# Patient Record
Sex: MALE | Race: WHITE | NOT HISPANIC OR LATINO | Employment: FULL TIME | URBAN - METROPOLITAN AREA
[De-identification: names, ages, dates, MRNs, and addresses within clinical notes are randomized per-mention and may not be internally consistent; named-entity substitution may affect disease eponyms.]

---

## 2023-05-12 LAB
ALBUMIN (G/DL) IN SER/PLAS: 4.3 G/DL (ref 3.4–5)
ANION GAP IN SER/PLAS: 11 MMOL/L (ref 10–20)
CALCIDIOL (25 OH VITAMIN D3) (NG/ML) IN SER/PLAS: 63 NG/ML
CALCIUM (MG/DL) IN SER/PLAS: 9.9 MG/DL (ref 8.6–10.6)
CARBON DIOXIDE, TOTAL (MMOL/L) IN SER/PLAS: 28 MMOL/L (ref 21–32)
CHLORIDE (MMOL/L) IN SER/PLAS: 104 MMOL/L (ref 98–107)
CREATININE (MG/DL) IN SER/PLAS: 0.98 MG/DL (ref 0.5–1.3)
GFR MALE: >90 ML/MIN/1.73M2
GLUCOSE (MG/DL) IN SER/PLAS: 82 MG/DL (ref 74–99)
PARATHYRIN INTACT (PG/ML) IN SER/PLAS: 41 PG/ML (ref 18.5–88)
PHOSPHATE (MG/DL) IN SER/PLAS: 3.9 MG/DL (ref 2.5–4.9)
POTASSIUM (MMOL/L) IN SER/PLAS: 4.1 MMOL/L (ref 3.5–5.3)
SODIUM (MMOL/L) IN SER/PLAS: 139 MMOL/L (ref 136–145)
THYROTROPIN (MIU/L) IN SER/PLAS BY DETECTION LIMIT <= 0.05 MIU/L: 1.32 MIU/L (ref 0.44–3.98)
THYROXINE (T4) FREE (NG/DL) IN SER/PLAS: 0.97 NG/DL (ref 0.78–1.48)
UREA NITROGEN (MG/DL) IN SER/PLAS: 15 MG/DL (ref 6–23)

## 2023-09-29 PROBLEM — E16.2 HYPOGLYCEMIA: Status: ACTIVE | Noted: 2023-09-29

## 2023-09-29 PROBLEM — J45.909 REACTIVE AIRWAY DISEASE (HHS-HCC): Status: ACTIVE | Noted: 2023-09-29

## 2023-09-29 PROBLEM — E03.9 HYPOTHYROIDISM: Status: ACTIVE | Noted: 2023-09-29

## 2023-09-29 PROBLEM — S62.609A FINGER FRACTURE, LEFT: Status: ACTIVE | Noted: 2023-09-29

## 2023-09-29 PROBLEM — R79.89 ELEVATED PROLACTIN LEVEL: Status: ACTIVE | Noted: 2023-09-29

## 2023-09-29 PROBLEM — M25.642 STIFFNESS OF FINGER JOINT OF LEFT HAND: Status: ACTIVE | Noted: 2023-09-29

## 2023-09-29 PROBLEM — G47.33 OBSTRUCTIVE SLEEP APNEA: Status: ACTIVE | Noted: 2023-09-29

## 2023-09-29 PROBLEM — K92.1 BLOOD IN STOOL: Status: ACTIVE | Noted: 2023-09-29

## 2023-09-29 PROBLEM — R00.0 TACHYCARDIA: Status: ACTIVE | Noted: 2023-09-29

## 2023-09-29 PROBLEM — G47.19 EXCESSIVE DAYTIME SLEEPINESS: Status: ACTIVE | Noted: 2023-09-29

## 2023-09-29 PROBLEM — E66.9 CLASS 2 OBESITY IN ADULT: Status: ACTIVE | Noted: 2023-09-29

## 2023-09-29 PROBLEM — E55.9 VITAMIN D DEFICIENCY: Status: ACTIVE | Noted: 2023-09-29

## 2023-09-29 PROBLEM — E66.812 CLASS 2 OBESITY IN ADULT: Status: ACTIVE | Noted: 2023-09-29

## 2023-09-29 PROBLEM — G47.30 SLEEP DISORDER BREATHING: Status: ACTIVE | Noted: 2023-09-29

## 2023-09-29 PROBLEM — R79.9 ABNORMAL BLOOD CHEMISTRY: Status: ACTIVE | Noted: 2023-09-29

## 2023-09-29 PROBLEM — E29.1 ANDROGEN DEFICIENCY: Status: ACTIVE | Noted: 2023-09-29

## 2023-09-29 PROBLEM — R79.89 ABNORMAL THYROID SCREEN (BLOOD): Status: ACTIVE | Noted: 2023-09-29

## 2023-09-29 PROBLEM — E29.1 HYPOGONADISM IN MALE: Status: ACTIVE | Noted: 2023-09-29

## 2023-09-29 PROBLEM — S62.657A CLOSED NONDISPLACED FRACTURE OF MIDDLE PHALANX OF LEFT LITTLE FINGER: Status: ACTIVE | Noted: 2023-09-29

## 2023-09-29 PROBLEM — N64.89 PSEUDOANGIOMATOUS STROMAL HYPERPLASIA OF BREAST: Status: ACTIVE | Noted: 2023-09-29

## 2023-09-29 PROBLEM — N62 IDIOPATHIC GYNECOMASTIA: Status: ACTIVE | Noted: 2023-09-29

## 2023-09-29 PROBLEM — T78.40XA ALLERGIC REACTION: Status: ACTIVE | Noted: 2023-09-29

## 2023-09-29 RX ORDER — IBUPROFEN 200 MG
1 CAPSULE ORAL 2 TIMES DAILY
COMMUNITY
Start: 2018-11-02

## 2023-09-29 RX ORDER — VIT C/E/ZN/COPPR/LUTEIN/ZEAXAN 250MG-90MG
5000 CAPSULE ORAL DAILY
COMMUNITY

## 2023-09-29 RX ORDER — PHENOL 1.4 %
1 AEROSOL, SPRAY (ML) MUCOUS MEMBRANE DAILY
COMMUNITY
Start: 2018-11-02

## 2023-09-29 RX ORDER — ALBUTEROL SULFATE 90 UG/1
2 AEROSOL, METERED RESPIRATORY (INHALATION) EVERY 4 HOURS PRN
COMMUNITY
Start: 2020-10-21

## 2023-09-29 RX ORDER — LEVOTHYROXINE SODIUM 50 UG/1
1 TABLET ORAL DAILY
COMMUNITY
Start: 2021-05-12 | End: 2023-10-16 | Stop reason: SDUPTHER

## 2023-10-02 ENCOUNTER — DOCUMENTATION (OUTPATIENT)
Dept: ORTHOPEDIC SURGERY | Facility: CLINIC | Age: 48
End: 2023-10-02

## 2023-10-02 ENCOUNTER — OFFICE VISIT (OUTPATIENT)
Dept: ORTHOPEDIC SURGERY | Facility: CLINIC | Age: 48
End: 2023-10-02
Payer: COMMERCIAL

## 2023-10-02 DIAGNOSIS — S69.92XD FINGER INJURY, LEFT, SUBSEQUENT ENCOUNTER: ICD-10-CM

## 2023-10-02 DIAGNOSIS — S63.639A SPRAIN OF PROXIMAL INTERPHALANGEAL (PIP) JOINT OF FINGER: Primary | ICD-10-CM

## 2023-10-02 PROCEDURE — 99213 OFFICE O/P EST LOW 20 MIN: CPT | Performed by: ORTHOPAEDIC SURGERY

## 2023-10-02 PROCEDURE — 1036F TOBACCO NON-USER: CPT | Performed by: ORTHOPAEDIC SURGERY

## 2023-10-02 NOTE — ADDENDUM NOTE
Addended by: JUAN LUIS DE LOS SANTOS on: 10/2/2023 10:10 AM     Modules accepted: Level of Service

## 2023-10-02 NOTE — PROGRESS NOTES
This encounter was accidentally created as a duplicate.  Please refer to office visit note from today's date.    Stephanie Chadwick PA-C

## 2023-10-02 NOTE — Clinical Note
October 2, 2023     Nelia Kee MD  [address]  [facility], [address]  [place] [address]    Patient: Maikel Watters   YOB: 1975   Date of Visit: 10/2/2023       Dear Dr. Nelia Kee MD:    Thank you for referring Maikel Watters to me for evaluation. Below are my notes for this consultation.  If you have questions, please do not hesitate to call me. I look forward to following your patient along with you.       Sincerely,     Earnest Null,       CC: No Recipients  ______________________________________________________________________________________

## 2023-10-02 NOTE — PROGRESS NOTES
10/2/2023    Chief Complaint   Patient presents with    Left Hand - Follow-up     Lt small finger pain and proximal PIP joint         HPI:  Patient Maikel Watters 47 y.o. male, presents today (10/2/2023) for evaluation of Lt small finger pain at proximal PIP joint.  They are 3 month(s) out from onset.  Original injury occurred when he was spiked with a volleyball.  He has been working with therapy on aggressive range of motion recovery.  He feels he is making very good progress.  He denies any pain or discomfort to the left small finger proximal interphalangeal joint now.  He still has some stiffness he is transitioning  Program for range of motion as of last week.  Denies any new injury or trauma.    Past Medical History:   Diagnosis Date    Other chest pain 04/13/2018    Chest wall pain    Personal history of other diseases of the respiratory system 04/03/2018    History of acute bronchitis    Personal history of other diseases of the respiratory system 01/27/2021    History of acute pharyngitis       Medication Documentation Review Audit    **Prior to Admission medications have not yet been reviewed**         Allergies   Allergen Reactions    Levothyroxine Swelling    Menthol-Sorbitol Unknown     Menthol Cough Lozenges     Other Swelling     Menthol Topical - Throat        Social History     Socioeconomic History    Marital status:      Spouse name: Not on file    Number of children: Not on file    Years of education: Not on file    Highest education level: Not on file   Occupational History    Not on file   Tobacco Use    Smoking status: Not on file    Smokeless tobacco: Not on file   Substance and Sexual Activity    Alcohol use: Not on file    Drug use: Not on file    Sexual activity: Not on file   Other Topics Concern    Not on file   Social History Narrative    Not on file     Social Determinants of Health     Financial Resource Strain: Not on file   Food Insecurity: Not on file   Transportation  Needs: Not on file   Physical Activity: Not on file   Stress: Not on file   Social Connections: Not on file   Intimate Partner Violence: Not on file   Housing Stability: Not on file       Past Surgical History:   Procedure Laterality Date    HERNIA REPAIR  06/07/2013    Hernia Repair    OTHER SURGICAL HISTORY  12/20/2022    Colonoscopy    OTHER SURGICAL HISTORY  11/10/2022    Mastectomy bilateral         Review of Systems:   GENERAL: Negative  GI: Negative  MUSCULOSKELETAL: See HPI  SKIN: Negative  NEURO:  Negative    Physical Exam:  GENERAL: No acute distress and breathing comfortably; pleasant and cooperative with the examination.  EXTREMITIES: Evaluation of left upper extremity finds the patient to have a palpable radial artery at the wrist with brisk capillary refill to all digits. The patient has intact sensorium to axillary, radial, median and ulnar nerves. There are no open wounds. There are no signs of infection. There is no evidence of lymphedema or lymphatic streaking. The patient has supple compartments of the left arm, forearm and hand.  Palmar pulp distance of about half centimeter with flexion of the left small finger, he has about 10 degrees of PIP flexion contracture with extension, but is passively correctable to about 5 degrees.    Imaging:  No images are attached to the encounter.     Assessment:  Left small finger proximal interphalangeal joint pain injury with continued flexion contracture but greatly improved after therapy.     Plan:  Recommendations were made to continue with weightbearing activities to tolerance.  He continue with aggressive motion recovery with home exercise program we discussed possibility of Kenalog injection into the left small finger PIP at some point in the future if it remains symptomatic, but this is pain-free today he continues to make progress he elects to hold off for now.  He will follow-up with our office on an as-needed basis.  All questions answered at today's  visit.    Stephanie Chadwick PA-C

## 2023-10-13 ENCOUNTER — TELEPHONE (OUTPATIENT)
Dept: PRIMARY CARE | Facility: HOSPITAL | Age: 48
End: 2023-10-13
Payer: COMMERCIAL

## 2023-10-13 NOTE — TELEPHONE ENCOUNTER
Rx Refill Request Telephone Encounter    Name:  Maikel Watters  :  [id number]  Medication Name:  Levothyroxine  50 mcg  Oral  Daily  30 day Supply  Take 1 tab po daily  Specific Pharmacy location:  [id number]  Date of last appointment:  05/10/2023   Date of next appointment:  11/10/2023  Best number to reach patient: [phone]    Patient needs lab order placed in his chart, so he can get them done two weeks before his appt, Please and Thank you!

## 2023-10-16 DIAGNOSIS — E03.8 OTHER SPECIFIED HYPOTHYROIDISM: ICD-10-CM

## 2023-10-16 RX ORDER — LEVOTHYROXINE SODIUM 50 UG/1
TABLET ORAL
Qty: 102 TABLET | Refills: 3 | Status: SHIPPED | OUTPATIENT
Start: 2023-10-16 | End: 2024-03-08 | Stop reason: SDUPTHER

## 2023-11-03 ENCOUNTER — LAB (OUTPATIENT)
Dept: LAB | Facility: LAB | Age: 48
End: 2023-11-03
Payer: COMMERCIAL

## 2023-11-03 DIAGNOSIS — E03.9 HYPOTHYROIDISM, UNSPECIFIED: ICD-10-CM

## 2023-11-03 DIAGNOSIS — E29.1 TESTICULAR HYPOFUNCTION: Primary | ICD-10-CM

## 2023-11-03 LAB
CORTIS SERPL-MCNC: 13.6 UG/DL (ref 2.5–20)
DHEA-S SERPL-MCNC: 81 UG/DL (ref 95–530)
FSH SERPL-ACNC: 5.2 IU/L
LH SERPL-ACNC: 0.7 IU/L
PROLACTIN SERPL-MCNC: 20.4 UG/L (ref 2–18)
T4 FREE SERPL-MCNC: 0.89 NG/DL (ref 0.78–1.48)
TSH SERPL-ACNC: 1.74 MIU/L (ref 0.44–3.98)

## 2023-11-03 PROCEDURE — 84439 ASSAY OF FREE THYROXINE: CPT

## 2023-11-03 PROCEDURE — 82533 TOTAL CORTISOL: CPT

## 2023-11-03 PROCEDURE — 84146 ASSAY OF PROLACTIN: CPT

## 2023-11-03 PROCEDURE — 84443 ASSAY THYROID STIM HORMONE: CPT

## 2023-11-03 PROCEDURE — 84402 ASSAY OF FREE TESTOSTERONE: CPT

## 2023-11-03 PROCEDURE — 83002 ASSAY OF GONADOTROPIN (LH): CPT

## 2023-11-03 PROCEDURE — 83001 ASSAY OF GONADOTROPIN (FSH): CPT

## 2023-11-03 PROCEDURE — 82627 DEHYDROEPIANDROSTERONE: CPT

## 2023-11-03 PROCEDURE — 82024 ASSAY OF ACTH: CPT

## 2023-11-03 PROCEDURE — 36415 COLL VENOUS BLD VENIPUNCTURE: CPT

## 2023-11-05 LAB — ACTH PLAS-MCNC: 20.6 PG/ML (ref 7.2–63.3)

## 2023-11-06 LAB
TESTOSTERONE FREE (CHAN): 26.9 PG/ML (ref 35–155)
TESTOSTERONE,TOTAL,LC-MS/MS: 164 NG/DL (ref 250–1100)

## 2023-11-08 ENCOUNTER — LAB REQUISITION (OUTPATIENT)
Dept: LAB | Facility: HOSPITAL | Age: 48
End: 2023-11-08
Payer: COMMERCIAL

## 2023-11-08 DIAGNOSIS — Z02.89 ENCOUNTER FOR OTHER ADMINISTRATIVE EXAMINATIONS: ICD-10-CM

## 2023-11-08 LAB
ALBUMIN SERPL BCP-MCNC: 4.3 G/DL (ref 3.4–5)
ALP SERPL-CCNC: 58 U/L (ref 33–120)
ALT SERPL W P-5'-P-CCNC: 11 U/L (ref 10–52)
ANION GAP SERPL CALC-SCNC: 10 MMOL/L (ref 10–20)
APPEARANCE UR: CLEAR
AST SERPL W P-5'-P-CCNC: 20 U/L (ref 9–39)
BILIRUB SERPL-MCNC: 0.6 MG/DL (ref 0–1.2)
BILIRUB UR STRIP.AUTO-MCNC: NEGATIVE MG/DL
BUN SERPL-MCNC: 14 MG/DL (ref 6–23)
CALCIUM SERPL-MCNC: 9.9 MG/DL (ref 8.6–10.6)
CHLORIDE SERPL-SCNC: 105 MMOL/L (ref 98–107)
CHOLEST SERPL-MCNC: 168 MG/DL (ref 0–199)
CHOLESTEROL/HDL RATIO: 3.4
CO2 SERPL-SCNC: 25 MMOL/L (ref 21–32)
COLOR UR: YELLOW
CREAT SERPL-MCNC: 0.79 MG/DL (ref 0.5–1.3)
ERYTHROCYTE [DISTWIDTH] IN BLOOD BY AUTOMATED COUNT: 12 % (ref 11.5–14.5)
GFR SERPL CREATININE-BSD FRML MDRD: >90 ML/MIN/1.73M*2
GLUCOSE SERPL-MCNC: 95 MG/DL (ref 74–99)
GLUCOSE UR STRIP.AUTO-MCNC: NEGATIVE MG/DL
HCT VFR BLD AUTO: 44.1 % (ref 41–52)
HDLC SERPL-MCNC: 49.2 MG/DL
HGB BLD-MCNC: 14.4 G/DL (ref 13.5–17.5)
KETONES UR STRIP.AUTO-MCNC: NEGATIVE MG/DL
LDLC SERPL CALC-MCNC: 106 MG/DL
LEUKOCYTE ESTERASE UR QL STRIP.AUTO: NEGATIVE
MCH RBC QN AUTO: 28.3 PG (ref 26–34)
MCHC RBC AUTO-ENTMCNC: 32.7 G/DL (ref 32–36)
MCV RBC AUTO: 87 FL (ref 80–100)
NITRITE UR QL STRIP.AUTO: NEGATIVE
NON HDL CHOLESTEROL: 119 MG/DL (ref 0–149)
NRBC BLD-RTO: 0 /100 WBCS (ref 0–0)
PH UR STRIP.AUTO: 7 [PH]
PLATELET # BLD AUTO: 207 X10*3/UL (ref 150–450)
POTASSIUM SERPL-SCNC: 4.2 MMOL/L (ref 3.5–5.3)
PROT SERPL-MCNC: 8 G/DL (ref 6.4–8.2)
PROT UR STRIP.AUTO-MCNC: NEGATIVE MG/DL
RBC # BLD AUTO: 5.09 X10*6/UL (ref 4.5–5.9)
RBC # UR STRIP.AUTO: NEGATIVE /UL
SODIUM SERPL-SCNC: 136 MMOL/L (ref 136–145)
SP GR UR STRIP.AUTO: 1.02
TRIGL SERPL-MCNC: 64 MG/DL (ref 0–149)
UROBILINOGEN UR STRIP.AUTO-MCNC: <2 MG/DL
VLDL: 13 MG/DL (ref 0–40)
WBC # BLD AUTO: 5.7 X10*3/UL (ref 4.4–11.3)

## 2023-11-08 PROCEDURE — 81003 URINALYSIS AUTO W/O SCOPE: CPT

## 2023-11-08 PROCEDURE — 80053 COMPREHEN METABOLIC PANEL: CPT

## 2023-11-08 PROCEDURE — 80061 LIPID PANEL: CPT

## 2023-11-08 PROCEDURE — 85027 COMPLETE CBC AUTOMATED: CPT

## 2023-11-10 ENCOUNTER — OFFICE VISIT (OUTPATIENT)
Dept: ENDOCRINOLOGY | Facility: HOSPITAL | Age: 48
End: 2023-11-10
Payer: COMMERCIAL

## 2023-11-10 VITALS
OXYGEN SATURATION: 98 % | DIASTOLIC BLOOD PRESSURE: 93 MMHG | BODY MASS INDEX: 30.51 KG/M2 | WEIGHT: 217.9 LBS | TEMPERATURE: 97.5 F | SYSTOLIC BLOOD PRESSURE: 137 MMHG | HEIGHT: 71 IN | HEART RATE: 71 BPM

## 2023-11-10 DIAGNOSIS — E03.9 HYPOTHYROIDISM, UNSPECIFIED TYPE: ICD-10-CM

## 2023-11-10 DIAGNOSIS — E29.1 HYPOGONADISM IN MALE: Primary | ICD-10-CM

## 2023-11-10 PROCEDURE — 99215 OFFICE O/P EST HI 40 MIN: CPT | Performed by: STUDENT IN AN ORGANIZED HEALTH CARE EDUCATION/TRAINING PROGRAM

## 2023-11-10 PROCEDURE — 1036F TOBACCO NON-USER: CPT | Performed by: STUDENT IN AN ORGANIZED HEALTH CARE EDUCATION/TRAINING PROGRAM

## 2023-11-10 ASSESSMENT — PATIENT HEALTH QUESTIONNAIRE - PHQ9
2. FEELING DOWN, DEPRESSED OR HOPELESS: NOT AT ALL
SUM OF ALL RESPONSES TO PHQ9 QUESTIONS 1 AND 2: 0
1. LITTLE INTEREST OR PLEASURE IN DOING THINGS: NOT AT ALL

## 2023-11-10 ASSESSMENT — COLUMBIA-SUICIDE SEVERITY RATING SCALE - C-SSRS
6. HAVE YOU EVER DONE ANYTHING, STARTED TO DO ANYTHING, OR PREPARED TO DO ANYTHING TO END YOUR LIFE?: NO
2. HAVE YOU ACTUALLY HAD ANY THOUGHTS OF KILLING YOURSELF?: NO
1. IN THE PAST MONTH, HAVE YOU WISHED YOU WERE DEAD OR WISHED YOU COULD GO TO SLEEP AND NOT WAKE UP?: NO

## 2023-11-10 ASSESSMENT — ENCOUNTER SYMPTOMS
OCCASIONAL FEELINGS OF UNSTEADINESS: 0
DEPRESSION: 0
LOSS OF SENSATION IN FEET: 0

## 2023-11-10 ASSESSMENT — PAIN SCALES - GENERAL: PAINLEVEL: 0-NO PAIN

## 2023-11-10 NOTE — PROGRESS NOTES
"Subjective   Mr. Watters is a 47 year old WM with history of Vitamin D Deficiency and subclinical hypothyroidism coming in for follow up.  Patient reports that he was being evaluated for inability to loose weight and was found to have abnormal TFTs. In October 2021 he started using Thyroid supplements. At that time TSH: 4.23 and FT4: 0.91. Repeated labs in January showed a TSH:5.01 and FT4: 0.71 so he was started on synthroid 25mcg.  Took Synthroid and had tingling sensation in his stomach and he felt a little different. Tried the second day an 1 hour afterwards had tachycardia, HR:120 bpm and that day he felt hyper and to calm down, he went for a walk. The second day he had a headache and when he took his medicine his Headache was worse. Chills and Shivers, nausea, irritable, things would irritate him and make him angry. Shakes. So he stopped taking it.   Was seen in February and at that time he was off LT4. TFTs repeated were TSH: 5.51.  Started on LT4 50 mcg daily in May after TSH: 7.13. Labs repeated in June: TSH: 2.56 and FT4: 1.05  Started Using a CPAP for DONYA. Started feeling better after starting levothyroxine     In 2018 Had low T and mildly elevated prolactin saw Lorenza Norman. 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 Not exhausted as much  When he plays volley ball for 2 hours gets tired in the afternoon  Increased physical activity in May  Had an MRI in 2018 no pituitary lesion  No GCs  Sleep: Good  No hot flashes  No night sweats  No morning erections  No decrease in sexual desire  No headaches  No allergies   No change in shaving frequency      Meds:   LT4  Men's once a day  Vitamin D with Mathew  Vitamin D  No herbal meds    Review of Systems  all pertinent systems reviewed and are otherwise negative   Objective   BP (!) 137/93 (BP Location: Left arm, Patient Position: Sitting, BP Cuff Size: Adult)   Pulse 71   Temp 36.4 °C (97.5 °F) (Temporal)   Ht 1.803 m (5' 11\")   Wt 98.8 kg (217 lb 14.4 oz)   SpO2 98%   BMI 30.39 kg/m²    Physical Exam  Constitutional:       General: He is not in acute distress.     Appearance: Normal appearance.   Eyes:      Extraocular Movements: Extraocular movements intact.      Pupils: Pupils are equal, round, and reactive to light.   Cardiovascular:      Rate and Rhythm: Normal rate and regular rhythm.   Pulmonary:      Effort: Pulmonary effort is normal. No respiratory distress.      Breath sounds: Normal breath sounds.   Abdominal:      General: Bowel sounds are normal.      Palpations: Abdomen is soft.      Tenderness: There is no abdominal tenderness.   Skin:     Coloration: Skin is not jaundiced or pale.      Findings: No erythema or rash.   Neurological:      General: No focal deficit present.      Mental Status: He is alert and oriented to person, place, and time.      Deep Tendon Reflexes: Reflexes normal.   Psychiatric:         Mood and Affect: Mood normal.         Behavior: Behavior normal.         Lab Results   Component Value Date    TSH 1.74 11/03/2023    FREET4 0.89 11/03/2023       Assessment/Plan Mr. Watters is a 47 year old WM with history of Vitamin D Deficiency and subclinical " hypothyroidism coming in for follow up.    # Subclinical hypothyroidism  Started on LT4 50 mcg daily in May 2021 after TSH: 7.13. Labs repeated in June: TSH: 2.56 and FT4: 1.05  Started Using a CPAP for DONYA. Started feeling better after starting levothyroxine  Last seen in May and at that time change to 50 mcg daily except Wednesday and Sunday 1.5 extra  Per patient has been feeling great and active recently better than he ever felt   Most recent blood work shows TSH 1.74    Plan:  Continue Levothyroxine 50mcg 1 pill a day except wednesday and Sunday take an exta 1/2 pill be taken on an empty stomach on its own 30min before other meds or food   Continue to exercise  Continue using your CPAP  Continue Vitamin D    # Low testosterone  In 2018 Had low T and mildly elevated prolactin saw Lorenza Norman. 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

## 2023-11-10 NOTE — PATIENT INSTRUCTIONS
Continue Levothyroxine 50mcg 1 pill a day except wednesday and Sunday take an exta 1/2 pill be taken on an empty stomach on its own 30min before other meds or food   Continue to exercise  Continue using your CPAP  Continue Vitamin D  In case any change in energy level or excessive fatigue let me know  We will repeat blood work in Feb/March if T still low we will obtain an MRI     Blood work before next apt     Follow up in March

## 2024-03-02 ENCOUNTER — LAB (OUTPATIENT)
Dept: LAB | Facility: LAB | Age: 49
End: 2024-03-02
Payer: COMMERCIAL

## 2024-03-02 DIAGNOSIS — E03.9 HYPOTHYROIDISM, UNSPECIFIED TYPE: ICD-10-CM

## 2024-03-02 DIAGNOSIS — E29.1 HYPOGONADISM IN MALE: ICD-10-CM

## 2024-03-02 PROCEDURE — 84443 ASSAY THYROID STIM HORMONE: CPT

## 2024-03-02 PROCEDURE — 83002 ASSAY OF GONADOTROPIN (LH): CPT

## 2024-03-02 PROCEDURE — 84402 ASSAY OF FREE TESTOSTERONE: CPT

## 2024-03-02 PROCEDURE — 36415 COLL VENOUS BLD VENIPUNCTURE: CPT

## 2024-03-02 PROCEDURE — 80069 RENAL FUNCTION PANEL: CPT

## 2024-03-02 PROCEDURE — 84439 ASSAY OF FREE THYROXINE: CPT

## 2024-03-02 PROCEDURE — 84146 ASSAY OF PROLACTIN: CPT

## 2024-03-02 PROCEDURE — 82627 DEHYDROEPIANDROSTERONE: CPT

## 2024-03-02 PROCEDURE — 83001 ASSAY OF GONADOTROPIN (FSH): CPT

## 2024-03-03 LAB
ALBUMIN SERPL BCP-MCNC: 4.5 G/DL (ref 3.4–5)
ANION GAP SERPL CALC-SCNC: 13 MMOL/L (ref 10–20)
BUN SERPL-MCNC: 15 MG/DL (ref 6–23)
CALCIUM SERPL-MCNC: 10 MG/DL (ref 8.6–10.6)
CHLORIDE SERPL-SCNC: 106 MMOL/L (ref 98–107)
CO2 SERPL-SCNC: 26 MMOL/L (ref 21–32)
CREAT SERPL-MCNC: 0.92 MG/DL (ref 0.5–1.3)
DHEA-S SERPL-MCNC: 93 UG/DL (ref 95–530)
EGFRCR SERPLBLD CKD-EPI 2021: >90 ML/MIN/1.73M*2
FSH SERPL-ACNC: 4.7 IU/L
GLUCOSE SERPL-MCNC: 93 MG/DL (ref 74–99)
LH SERPL-ACNC: 0.7 IU/L
PHOSPHATE SERPL-MCNC: 3.9 MG/DL (ref 2.5–4.9)
POTASSIUM SERPL-SCNC: 4.4 MMOL/L (ref 3.5–5.3)
PROLACTIN SERPL-MCNC: 23.3 UG/L (ref 2–18)
SODIUM SERPL-SCNC: 141 MMOL/L (ref 136–145)
T4 FREE SERPL-MCNC: 0.96 NG/DL (ref 0.78–1.48)
TSH SERPL-ACNC: 1.91 MIU/L (ref 0.44–3.98)

## 2024-03-07 LAB
TESTOSTERONE FREE (CHAN): 21 PG/ML (ref 35–155)
TESTOSTERONE,TOTAL,LC-MS/MS: 146 NG/DL (ref 250–1100)

## 2024-03-08 ENCOUNTER — OFFICE VISIT (OUTPATIENT)
Dept: ENDOCRINOLOGY | Facility: HOSPITAL | Age: 49
End: 2024-03-08
Payer: COMMERCIAL

## 2024-03-08 VITALS
SYSTOLIC BLOOD PRESSURE: 152 MMHG | HEIGHT: 71 IN | HEART RATE: 92 BPM | TEMPERATURE: 97 F | BODY MASS INDEX: 30.8 KG/M2 | OXYGEN SATURATION: 97 % | DIASTOLIC BLOOD PRESSURE: 90 MMHG | WEIGHT: 220 LBS

## 2024-03-08 DIAGNOSIS — E03.8 OTHER SPECIFIED HYPOTHYROIDISM: ICD-10-CM

## 2024-03-08 DIAGNOSIS — E29.1 HYPOGONADISM IN MALE: Primary | ICD-10-CM

## 2024-03-08 DIAGNOSIS — E03.9 HYPOTHYROIDISM, UNSPECIFIED TYPE: ICD-10-CM

## 2024-03-08 PROBLEM — N64.4 BREAST PAIN: Status: ACTIVE | Noted: 2023-09-29

## 2024-03-08 PROBLEM — M25.649 STIFFNESS OF FINGER JOINT: Status: ACTIVE | Noted: 2023-09-29

## 2024-03-08 PROBLEM — J20.9 ACUTE BRONCHITIS: Status: ACTIVE | Noted: 2024-03-08

## 2024-03-08 PROBLEM — K92.1 HEMATOCHEZIA: Status: ACTIVE | Noted: 2023-09-29

## 2024-03-08 PROBLEM — J02.9 SORE THROAT: Status: ACTIVE | Noted: 2024-03-08

## 2024-03-08 PROBLEM — R40.0 DAYTIME SOMNOLENCE: Status: ACTIVE | Noted: 2023-09-29

## 2024-03-08 PROBLEM — Z78.9 OTHER SPECIFIED HEALTH STATUS: Status: ACTIVE | Noted: 2023-08-10

## 2024-03-08 PROBLEM — S62.609A FRACTURE OF PHALANX OF FINGER: Status: ACTIVE | Noted: 2023-08-21

## 2024-03-08 PROBLEM — R09.A2 FOREIGN BODY SENSATION IN THROAT: Status: ACTIVE | Noted: 2018-05-14

## 2024-03-08 PROBLEM — M79.645 PAIN IN LEFT FINGER(S): Status: ACTIVE | Noted: 2023-08-10

## 2024-03-08 PROBLEM — D69.6 THROMBOCYTOPENIA (CMS-HCC): Status: ACTIVE | Noted: 2024-03-08

## 2024-03-08 PROBLEM — T17.208A FOREIGN BODY IN THROAT, INITIAL ENCOUNTER: Status: ACTIVE | Noted: 2018-05-13

## 2024-03-08 PROCEDURE — 1036F TOBACCO NON-USER: CPT | Performed by: STUDENT IN AN ORGANIZED HEALTH CARE EDUCATION/TRAINING PROGRAM

## 2024-03-08 PROCEDURE — 99215 OFFICE O/P EST HI 40 MIN: CPT | Performed by: STUDENT IN AN ORGANIZED HEALTH CARE EDUCATION/TRAINING PROGRAM

## 2024-03-08 RX ORDER — POLYETHYLENE GLYCOL 3350, SODIUM SULFATE ANHYDROUS, SODIUM BICARBONATE, SODIUM CHLORIDE, POTASSIUM CHLORIDE 236; 22.74; 6.74; 5.86; 2.97 G/4L; G/4L; G/4L; G/4L; G/4L
POWDER, FOR SOLUTION ORAL
COMMUNITY
Start: 2022-11-10

## 2024-03-08 RX ORDER — MULTIVIT-MIN/IRON/FOLIC ACID/K 18-600-40
CAPSULE ORAL
COMMUNITY
Start: 2018-11-02

## 2024-03-08 RX ORDER — ACETAMINOPHEN 500 MG
TABLET ORAL
COMMUNITY

## 2024-03-08 RX ORDER — LEVOTHYROXINE SODIUM 50 UG/1
TABLET ORAL
Qty: 102 TABLET | Refills: 3 | Status: SHIPPED | OUTPATIENT
Start: 2024-03-08

## 2024-03-08 ASSESSMENT — ENCOUNTER SYMPTOMS
LOSS OF SENSATION IN FEET: 0
OCCASIONAL FEELINGS OF UNSTEADINESS: 0
DEPRESSION: 0

## 2024-03-08 ASSESSMENT — PATIENT HEALTH QUESTIONNAIRE - PHQ9
1. LITTLE INTEREST OR PLEASURE IN DOING THINGS: NOT AT ALL
2. FEELING DOWN, DEPRESSED OR HOPELESS: NOT AT ALL
SUM OF ALL RESPONSES TO PHQ9 QUESTIONS 1 AND 2: 0

## 2024-03-08 ASSESSMENT — PAIN SCALES - GENERAL: PAINLEVEL: 0-NO PAIN

## 2024-03-08 NOTE — PATIENT INSTRUCTIONS
Continue Levothyroxine 50 mcg daily except Wednesday take 2 tablets  to be taken on an empty stomach on its own 30min before other meds or food   MRI sella with and without IV contrast    Follow up in August

## 2024-03-08 NOTE — PROGRESS NOTES
Subjective   Mr. Watters is a 48 year old WM with history of Vitamin D Deficiency and subclinical hypothyroidism coming in for follow up.     Has been playing basketball  Hasn't been sick even though everybody sick  Energy: Highest  Didn't sleep except 4 hours  Not exhausted  Watching the kids.    # Subclinical hypothyroidism  Started on LT4 50 mcg daily in May 2021 after TSH: 7.13. Labs repeated in June: TSH: 2.56 and FT4: 1.05  Started Using a CPAP for DONYA. Started feeling better after starting levothyroxine  Last seen in May and at that time change to 50 mcg daily except Wednesday 2 pills   Per patient has been feeling great and active recently better than he ever felt   Most recent blood work shows TSH 1.74   Lab Results   Component Value Date    TSH 1.91 03/02/2024     Had speech issues      # Low testosterone  In 2018 Had low T and mildly elevated prolactin saw Lorenza Norman. 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 Prolactin 23.3. LH: 0.7FSH: 4.7  TSH: 1.91    No headaches   Vision is improving  No double vision  No change in peripheral vision  No change in libido  Couple morning erections  Maintain erection when having sex  No hot flashes   No night sweats  DONYA using his CPAP thinks it helped a lot      Daughter had issues low thyroid  Had speech issues  Did not have puberty started on  "estrogen    Meds:  D3   MVI  Ca/Vitamin D  LT4  No herbal meds  No biotin        Review of Systems  all pertinent systems reviewed and are otherwise negative   Objective   /90 (BP Location: Right arm, Patient Position: Sitting, BP Cuff Size: Adult)   Pulse 92   Temp 36.1 °C (97 °F) (Temporal)   Ht 1.803 m (5' 11\")   Wt 99.8 kg (220 lb)   SpO2 97%   BMI 30.68 kg/m²    Physical Exam  Constitutional:       General: He is not in acute distress.     Appearance: Normal appearance.   Eyes:      Extraocular Movements: Extraocular movements intact.      Pupils: Pupils are equal, round, and reactive to light.   Cardiovascular:      Rate and Rhythm: Normal rate and regular rhythm.   Pulmonary:      Effort: Pulmonary effort is normal. No respiratory distress.      Breath sounds: Normal breath sounds.   Abdominal:      General: Bowel sounds are normal.      Palpations: Abdomen is soft.      Tenderness: There is no abdominal tenderness.   Skin:     Coloration: Skin is not jaundiced or pale.      Findings: No erythema or rash.   Neurological:      General: No focal deficit present.      Mental Status: He is alert and oriented to person, place, and time.      Deep Tendon Reflexes: Reflexes normal.   Psychiatric:         Mood and Affect: Mood normal.         Behavior: Behavior normal.         Lab Results   Component Value Date    TSH 1.91 03/02/2024    FREET4 0.96 03/02/2024       Assessment/Plan   Mr. Watters is a 48 year old WM with history of Vitamin D Deficiency and subclinical hypothyroidism coming in for follow up.     Has been playing basketball  Energy: Highest  Didn't sleep except 4 hours    # Subclinical hypothyroidism  Started on LT4 50 mcg daily in May 2021 after TSH: 7.13. Labs repeated in June: TSH: 2.56 and FT4: 1.05  Started Using a CPAP for DONYA. Started feeling better after starting levothyroxine  Last seen in May and at that time change to 50 mcg daily except Wednesday 2 pills   Per patient has been " feeling great and active recently better than he ever felt   Most recent blood work shows TSH 1.74   Lab Results   Component Value Date    TSH 1.91 03/02/2024   Plan  Continue Levothyroxine 50 mcg daily except Wednesday take 2 tablets  to be taken on an empty stomach on its own 30min before other meds or food      # Low testosterone  In 2018 Had low T and mildly elevated prolactin saw Lorenza Norman. 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 Prolactin 23.3. LH: 0.7FSH: 4.7  TSH: 1.91  Plan  MRI sella with and without IV contrast    Follow up in August

## 2024-03-26 ENCOUNTER — HOSPITAL ENCOUNTER (OUTPATIENT)
Dept: RADIOLOGY | Facility: HOSPITAL | Age: 49
Discharge: HOME | End: 2024-03-26
Payer: COMMERCIAL

## 2024-03-26 DIAGNOSIS — E29.1 HYPOGONADISM IN MALE: ICD-10-CM

## 2024-03-26 PROCEDURE — 70553 MRI BRAIN STEM W/O & W/DYE: CPT | Performed by: RADIOLOGY

## 2024-03-26 PROCEDURE — A9575 INJ GADOTERATE MEGLUMI 0.1ML: HCPCS | Performed by: STUDENT IN AN ORGANIZED HEALTH CARE EDUCATION/TRAINING PROGRAM

## 2024-03-26 PROCEDURE — 2550000001 HC RX 255 CONTRASTS: Performed by: STUDENT IN AN ORGANIZED HEALTH CARE EDUCATION/TRAINING PROGRAM

## 2024-03-26 PROCEDURE — 70553 MRI BRAIN STEM W/O & W/DYE: CPT

## 2024-03-26 RX ORDER — GADOTERATE MEGLUMINE 376.9 MG/ML
20 INJECTION INTRAVENOUS
Status: COMPLETED | OUTPATIENT
Start: 2024-03-26 | End: 2024-03-26

## 2024-03-26 RX ADMIN — GADOTERATE MEGLUMINE 20 ML: 376.9 INJECTION INTRAVENOUS at 16:03

## 2024-07-22 ENCOUNTER — APPOINTMENT (OUTPATIENT)
Dept: SLEEP MEDICINE | Facility: CLINIC | Age: 49
End: 2024-07-22
Payer: COMMERCIAL

## 2024-07-22 VITALS
RESPIRATION RATE: 18 BRPM | DIASTOLIC BLOOD PRESSURE: 88 MMHG | OXYGEN SATURATION: 96 % | HEIGHT: 71 IN | BODY MASS INDEX: 32.4 KG/M2 | WEIGHT: 231.4 LBS | SYSTOLIC BLOOD PRESSURE: 134 MMHG | HEART RATE: 70 BPM

## 2024-07-22 DIAGNOSIS — G47.33 OSA (OBSTRUCTIVE SLEEP APNEA): Primary | ICD-10-CM

## 2024-07-22 PROCEDURE — 99213 OFFICE O/P EST LOW 20 MIN: CPT | Performed by: GENERAL PRACTICE

## 2024-07-22 PROCEDURE — 1036F TOBACCO NON-USER: CPT | Performed by: GENERAL PRACTICE

## 2024-07-22 PROCEDURE — 3008F BODY MASS INDEX DOCD: CPT | Performed by: GENERAL PRACTICE

## 2024-07-22 ASSESSMENT — COLUMBIA-SUICIDE SEVERITY RATING SCALE - C-SSRS
2. HAVE YOU ACTUALLY HAD ANY THOUGHTS OF KILLING YOURSELF?: NO
1. IN THE PAST MONTH, HAVE YOU WISHED YOU WERE DEAD OR WISHED YOU COULD GO TO SLEEP AND NOT WAKE UP?: NO
6. HAVE YOU EVER DONE ANYTHING, STARTED TO DO ANYTHING, OR PREPARED TO DO ANYTHING TO END YOUR LIFE?: NO

## 2024-07-22 ASSESSMENT — PATIENT HEALTH QUESTIONNAIRE - PHQ9
SUM OF ALL RESPONSES TO PHQ9 QUESTIONS 1 AND 2: 0
2. FEELING DOWN, DEPRESSED OR HOPELESS: NOT AT ALL
1. LITTLE INTEREST OR PLEASURE IN DOING THINGS: NOT AT ALL

## 2024-07-22 ASSESSMENT — SLEEP AND FATIGUE QUESTIONNAIRES
SITING INACTIVE IN A PUBLIC PLACE LIKE A CLASS ROOM OR A MOVIE THEATER: WOULD NEVER DOZE
HOW LIKELY ARE YOU TO NOD OFF OR FALL ASLEEP WHILE LYING DOWN TO REST IN THE AFTERNOON WHEN CIRCUMSTANCES PERMIT: WOULD NEVER DOZE
HOW LIKELY ARE YOU TO NOD OFF OR FALL ASLEEP WHILE SITTING QUIETLY AFTER LUNCH WITHOUT ALCOHOL: WOULD NEVER DOZE
ESS-CHAD TOTAL SCORE: 1
HOW LIKELY ARE YOU TO NOD OFF OR FALL ASLEEP WHILE SITTING AND TALKING TO SOMEONE: WOULD NEVER DOZE
HOW LIKELY ARE YOU TO NOD OFF OR FALL ASLEEP WHILE WATCHING TV: SLIGHT CHANCE OF DOZING
HOW LIKELY ARE YOU TO NOD OFF OR FALL ASLEEP IN A CAR, WHILE STOPPED FOR A FEW MINUTES IN TRAFFIC: WOULD NEVER DOZE
HOW LIKELY ARE YOU TO NOD OFF OR FALL ASLEEP WHILE SITTING AND READING: WOULD NEVER DOZE
HOW LIKELY ARE YOU TO NOD OFF OR FALL ASLEEP WHEN YOU ARE A PASSENGER IN A CAR FOR AN HOUR WITHOUT A BREAK: WOULD NEVER DOZE

## 2024-07-22 ASSESSMENT — ENCOUNTER SYMPTOMS
DEPRESSION: 0
OCCASIONAL FEELINGS OF UNSTEADINESS: 0
LOSS OF SENSATION IN FEET: 0

## 2024-07-22 NOTE — PROGRESS NOTES
Patient: Maikel Watters    [id number]  : 1975 -- AGE 48 y.o.    Provider: Robbie Hale DO     Location [facility]   Service Date: 2024              [facility]  Follow up Visit Note        HISTORY OF PRESENT ILLNESS     HISTORY OF PRESENT ILLNESS   Maikel Watters is a 48 y.o. male who presents to a [facility] for a sleep medicine evaluation with concerns of Follow-up (Using pap and ok with getting supplies ).     The patient  has a past medical history of Other chest pain (2018), Personal history of other diseases of the respiratory system (2018), and Personal history of other diseases of the respiratory system (2021)..    PAST SLEEP HISTORY    pmhx including obesity, hypothyroidism, reactive airway disease, elevated bp.     He presented originally as he has daytime sleepiness and fatigue. He was recently diagnosed with hypothyroidism and recently started on meds.   His pcp was concerned about sleep related problems contributing to his symptoms.   He has always snored, roommate in college has told him he snores. the fatigue and sleepiness started since 2019 and worsened with covid and his continuos changing schedule.   walks on the weekends.      22: Patient is finding pap therapy very beneficial and would like to continue using it. Patient finds that his machine is helping him sleep much better, his environment allergies have improved, he feels more refreshed when he wakes up. He takes it with him when he travels.   download reviewed, scanned into chart.       23  Patient is using pap therapy regularly, he finds mask and pressure to be comfortable.     CURRENT HISTORY    On today's visit, 2024, the patient reports that he is uring pap therapy regularly with no issues.     PAP Related Problems: no leak perceived,~no air hunger/need more pressure,~no dry mouth,~no skin irritation from  mask~and~no snoring with PAP.     Perceived Benefits of PAP Therapy: refreshing sleep,~decreased nocturia,~decreased daytime sleepiness,~decreased nocturnal awakenings~and~decreased snoring/ choking/ gasping.   Comments: improved nasal congestion.     Sleep schedule  on weekdays / work days:  Usual Bedtime: 10pm  Sleep latency: few min  Wake time : 6:30 am.   Total sleep time average/day: 8 hours/day  Awakenings: rarely, nocturia, short.   Naps: n    Sleep schedule  on weekends/non work days :  Same as above schedule.     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     Preferred sleeping position: SLEEP POSITION: sidelying    Sleep-related ROS:    Snoring:  n  Witnessed apneas:    n    Gasping/ choking: n     Am Dry mouth:  n           Nasal congestion: sometimes        am headaches: n    Sleep is described as refreshing.     Daytime sleepiness: n  Fatigue or decreased energy: n  Difficulty remembering things in daytime: n  Difficulty staying focused in daytime: : n  Irritable during the day: n    Drowsy driving: n  Hx of car accident: n  Near-miss Car accident: n      RLS screen:  RLSSCREEN: - Sensations: Patient does not have unusual sensations in their extremities that cause an urge to move them     Sleep-related behaviors: DENIES    ESS: 1       REVIEW OF SYSTEMS     REVIEW OF SYSTEMS  Review of Systems   All other systems reviewed and are negative.        ALLERGIES AND MEDICATIONS     ALLERGIES  Allergies   Allergen Reactions    Levothyroxine Swelling    Menthol Unknown    Menthol-Sorbitol Unknown     Menthol Cough Lozenges     Other Swelling     Menthol Topical - Throat       MEDICATIONS  Current Outpatient Medications   Medication Sig Dispense Refill    ascorbic acid, vitamin C, 500 mg capsule Take by mouth.      calcium carbonate-vitamin D3 500 mg-3.125 mcg (125 unit) tablet tablet Take 1 tablet by  "mouth 2 times a day.      cholecalciferol (Vitamin D-3) 25 MCG (1000 UT) capsule Take 5 capsules (125 mcg) by mouth once daily.      cholecalciferol (Vitamin D3) 5,000 Units tablet Take by mouth.      levothyroxine (Synthroid, Levoxyl) 50 mcg tablet Take 1 tablet daily in the am on empty stomach as directed. Extra half on Wednesday and Sunday 102 tablet 3    multivitamin with minerals (Adults Multivitamin) tablet Take 1 tablet by mouth once daily.      polyethylene glycol-electrolytes (polyethylene glycol) 420 gram solution Take by mouth.      albuterol 90 mcg/actuation inhaler Inhale 2 puffs every 4 hours if needed for wheezing or other (cough).       No current facility-administered medications for this visit.         PAST HISTORY     PAST MEDICAL HISTORY  He  has a past medical history of Other chest pain (04/13/2018), Personal history of other diseases of the respiratory system (04/03/2018), and Personal history of other diseases of the respiratory system (01/27/2021).    PAST SURGICAL HISTORY:  Past Surgical History:   Procedure Laterality Date    HERNIA REPAIR  06/07/2013    Hernia Repair    OTHER SURGICAL HISTORY  12/20/2022    Colonoscopy    OTHER SURGICAL HISTORY  11/10/2022    Mastectomy bilateral       FAMILY HISTORY  Family History   Problem Relation Name Age of Onset    Congenital heart disease Mother      Congenital heart disease Brother      Sleep apnea Brother          on CPAP    Colon polyps Brother          sigmoid     DOES/DOES NOT EC: does have a family history of sleep disorder.      SOCIAL HISTORY  He  reports that he has never smoked. He has never used smokeless tobacco. He reports that he does not currently use alcohol. He reports that he does not use drugs.     Caffeine consumption: no      PHYSICAL EXAM     VITAL SIGNS: /88   Pulse 70   Resp 18   Ht 1.803 m (5' 11\")   Wt 105 kg (231 lb 6.4 oz)   SpO2 96%   BMI 32.27 kg/m²      PREVIOUS WEIGHTS:  Wt Readings from Last 3 " Encounters:   07/22/24 105 kg (231 lb 6.4 oz)   03/26/24 99.8 kg (220 lb)   03/08/24 99.8 kg (220 lb)       Physical Exam  Constitutional: Alert and oriented, cooperative, no obvious distress.   HENT: normocephalic.   Neurologic: AOx3.   psychiatric: appropriate mood and affect.  Integumentary: no significant rashes observed over pap mask area.       RESULTS/DATA     Iron (ug/dL)   Date Value   10/20/2022 88     Transferrin (mg/dL)   Date Value   10/20/2022 234     Iron Saturation (%)   Date Value   10/20/2022 27     TIBC (ug/dL)   Date Value   10/20/2022 322               ASSESSMENT/PLAN     Mr. Watters is a 48 y.o. male and  has a past medical history of Other chest pain (04/13/2018), Personal history of other diseases of the respiratory system (04/03/2018), and Personal history of other diseases of the respiratory system (01/27/2021). He is following up on sleep apnea.     Problem List Items Addressed This Visit    None      Problem List and Orders     male with pmhx including obesity, hypothyroidism, reactive airway disease, elevated bp.     1-DONYA  original symptoms include has sleepiness and fatigue during the day, wakes up a few times per week due to nocturia, and snores. brother has sleep apnea and on pap therapy.      -psg 3/12/21-->mild DONYA, AHI 13.7; spO2 andre 87.3% worse in supine and REM. PACs but patient denies any symptoms including no cp/ sob/ palpitations. Arm leads monitored but no excessive muscle twitch activity.      -cont. Autopap 5-15cwp. rAHI 0.5 median pressure 5.9, max avg 11.2, good seal overall; good compliance.   patient is seeing an improvement in symptoms and is not having any issues.      -do not drive or operate heavy machinery if drowsy.  -avoid sleeping on your back.   -avoid sedating substances/ medication, alcohol, illicit drugs and tobacco.     2-Obese  counseled on eating a healthy diet and exercising as tolerated.  joined the volleyball team at work, does walking and swimming.       3- Acting out of dreams? resolved   was 1x per month, when he dreams about running, he runs. no injuries. not falling out of bed. usually he finds himself caught in the bed cover.   in lab sleep study did not show increased muscle twitch in REM. episodes might be due to respiratory events. no episodes while on pap therapy.    counseled on safety measures.         f/u 12 months or sooner as needed

## 2024-07-22 NOTE — PATIENT INSTRUCTIONS
[facility] Sleep Medicine   [facility]  [address] [facility] [address]  [phone]       NAME: Maikel Watters   DATE: 7/22/2024     Your Sleep Provider Today: Robbie Hale DO  Your Primary Care Physician: Nelia Kee MD     DIAGNOSIS:   1. DONYA (obstructive sleep apnea)  Positive Airway Pressure (PAP) Therapy          Thank you for coming to the Sleep Medicine Clinic today! Your sleep medicine provider today was: Robbie Hale DO Below is a summary of your treatment plan, other important information, and our contact numbers:      TREATMENT PLAN     Follow up in 12months or sooner as needed.     Instructions - Common DONYA Recs: - For your sleep apnea, continue to use your PAP every night and use it whenever you are sleeping.   - Avoid alcohol or sedatives several hours prior to sleeping.   - Get additional supplies for your PAP (e.g., mask, hose, filters) every 3 months or as your insurance allows from your [employer] company. Replacement cushions for your PAP mask can be requested monthly if airseals are an issue.  - Remember to clean your mask, tubings, and water chamber regularly as instructed.  - Avoid driving or operating heavy machinery when drowsy. A person driving while sleepy is five (5) times more likely to have an accident. If you feel sleepy, pull over and take a short power nap (sleep for less than 30 minutes). Otherwise, ask somebody to drive you.      IMPORTANT INFORMATION     Call 911 for medical emergencies.  Our offices are generally open from Monday-Friday, 9 am - 5 pm.  If you need to get in touch with me, you may either call me and my team(number is below) or you can use [email or web address].  If a referral for a test, for CPAP, or for another specialist was made, and you have not heard about scheduling this within a week, please call scheduling at [phone]).  If you are unable to make your appointment for clinic or an overnight study, kindly call  the office at least 48 hours in advance to cancel and reschedule.  If you are on CPAP, please bring your device's card or the device to each clinic appointment.   There are no supporting services by either the sleep doctors or their staff on weekends and Holidays, or after 5 PM on weekdays.   If you have been asked to come to a sleep study, make sure you bring toiletries, a comfy pillow, and any nighttime medications that you may regularly take. Also be sure to eat dinner before you arrive. We generally do not provide meals.      PRESCRIPTIONS     We require 7 days advanced notice for prescription refills. If we do not receive the request in this time, we cannot guarantee that your medication will be refilled in time.      IMPORTANT PHONE NUMBERS     Sleep Medicine Clinic Fax: [phone]  Appointments (for Pediatric Sleep Clinic): [phone] - option 1  Appointments (for Adult Sleep Clinic): [phone] - option 2  Appointments (For Sleep Studies): [phone] - option 3  Behavioral Sleep Medicine: [phone]  Sleep Surgery: [phone]  ENT (Otolaryngology): [phone]  Headache Clinic (Neurology): [phone]  Neurology: [phone]  Psychiatry: [phone]  Pulmonary Function Testing (PFT) Center: [phone]  Pulmonary Medicine: [phone]  [employer] (DME): [phone]  [employer] ([email or web address]) (DME): [phone]  [facility] (DME): [phone]      OUR ADULT SLEEP MEDICINE TEAM   Please do not hesitate to call the office or sleep nurse with any questions between appointments:    Adult Sleep Nurses (Vanita Parkinson, RN and Nkechi Gonsalez RN):  For clinical questions and refilling prescriptions: [phone]  Email sleep diaries and other documents at: [email or web address]    Adult Sleep Medicine Secretaries:  Anita Macdonald (For Stephanie/Alvarado/Susy/Ana/Brenda/Devyn):   P: [phone]  F: [phone]  Jane Stevens (For  Shay/Chikis): P: [phone]  Fax: [phone]  Nikole Moreland (For Jurcevic/Blank): P: [phone]  F: [phone]  Patricia Rene (For Bridget): P: [phone]  F: [phone]  Brianne Alfredito (For Mary/Anurag/Zakhary): P: [phone]  F: [phone]  Liz Chau (For Manny/Arredondo): P: [phone]  F: [phone]     Adult Sleep Medicine Advanced Practice Providers:  Bryant Cooper ([place], [place])  Marisol Osborn ([facility])  Ingrid Diop CNP ([place], [place], [place])  Joseline Jain CNP ([place], [place], [place])  Maryana Gill ([place], [place], [place])  Chemo Arredondo CNP ([place], [place])        OUR SLEEP TESTING LOCATIONS     Our team will contact you to schedule your sleep study, however, you can contact us as follow:  Main Phone Line (scheduling only): [phone] ([phone]), option 3  Adult and Pediatric Locations  [facility] (6 years and older): [facility] - 4th floor ([address] [place] [address]) After hours line: [phone]  [facility] (Main campus: All ages): [facility], 6th floor. After hours line: [phone]   [place] (5 years and older; younger considered on case-by-case basis): [address]; [address], [address]. Scheduling  After hours line: [phone]   [place] (6 years and older): [address]; [address]; [address]   [place] (6 years and older): [address]  After hours line: [phone]   [place] (13 years and older) in [place]: [address], 2nd floor  After hours line: [phone]  [place] (13 year and older): [address]  After hours line: [phone]     Adult Only Locations:   [place] (18 years and older): [address] [place] [address], 2nd floor   [place] (18 years and older): [address] [facility]; 4th floor  After hours line: [phone]  [facility] (18 years and older) at [place]:  "[address] [place] [address]  After hours line: [phone]          CONTACTING YOUR SLEEP MEDICINE PROVIDER     Send a message directly to your provider through \"My Chart\", which is the email service through your  Records Account: [email or web address]   Call [phone] and leave a message. One of the administrative assistants will forward the message to your sleep medicine provider through \"My Chart\" and/or email.     Your sleep medicine provider for this visit was: Robbie Hale DO        "

## 2024-08-27 ENCOUNTER — APPOINTMENT (OUTPATIENT)
Dept: ENDOCRINOLOGY | Facility: HOSPITAL | Age: 49
End: 2024-08-27
Payer: COMMERCIAL

## 2024-08-27 ENCOUNTER — APPOINTMENT (OUTPATIENT)
Dept: ENDOCRINOLOGY | Facility: CLINIC | Age: 49
End: 2024-08-27
Payer: COMMERCIAL

## 2024-08-27 ENCOUNTER — LAB (OUTPATIENT)
Dept: LAB | Facility: LAB | Age: 49
End: 2024-08-27
Payer: COMMERCIAL

## 2024-08-27 VITALS
SYSTOLIC BLOOD PRESSURE: 154 MMHG | WEIGHT: 228.8 LBS | HEART RATE: 72 BPM | BODY MASS INDEX: 31.91 KG/M2 | DIASTOLIC BLOOD PRESSURE: 90 MMHG

## 2024-08-27 DIAGNOSIS — E03.9 HYPOTHYROIDISM, UNSPECIFIED TYPE: ICD-10-CM

## 2024-08-27 DIAGNOSIS — E29.1 HYPOGONADISM IN MALE: ICD-10-CM

## 2024-08-27 DIAGNOSIS — E03.8 OTHER SPECIFIED HYPOTHYROIDISM: ICD-10-CM

## 2024-08-27 DIAGNOSIS — D35.2 PITUITARY ADENOMA (MULTI): ICD-10-CM

## 2024-08-27 DIAGNOSIS — D35.2 PITUITARY ADENOMA (MULTI): Primary | ICD-10-CM

## 2024-08-27 LAB
ALBUMIN SERPL BCP-MCNC: 4.2 G/DL (ref 3.4–5)
ANION GAP SERPL CALC-SCNC: 12 MMOL/L (ref 10–20)
BUN SERPL-MCNC: 16 MG/DL (ref 6–23)
CALCIUM SERPL-MCNC: 9.5 MG/DL (ref 8.6–10.6)
CHLORIDE SERPL-SCNC: 106 MMOL/L (ref 98–107)
CO2 SERPL-SCNC: 24 MMOL/L (ref 21–32)
CORTIS SERPL-MCNC: 3.8 UG/DL (ref 2.5–20)
CREAT SERPL-MCNC: 0.86 MG/DL (ref 0.5–1.3)
DHEA-S SERPL-MCNC: 69 UG/DL (ref 95–530)
EGFRCR SERPLBLD CKD-EPI 2021: >90 ML/MIN/1.73M*2
FSH SERPL-ACNC: 5 IU/L
GLUCOSE SERPL-MCNC: 87 MG/DL (ref 74–99)
LH SERPL-ACNC: 1.6 IU/L
PHOSPHATE SERPL-MCNC: 3.9 MG/DL (ref 2.5–4.9)
POTASSIUM SERPL-SCNC: 4.1 MMOL/L (ref 3.5–5.3)
PROLACTIN SERPL-MCNC: 22.6 UG/L (ref 2–18)
SODIUM SERPL-SCNC: 138 MMOL/L (ref 136–145)
T4 FREE SERPL-MCNC: 1.06 NG/DL (ref 0.78–1.48)
TSH SERPL-ACNC: 1.73 MIU/L (ref 0.44–3.98)

## 2024-08-27 PROCEDURE — 82627 DEHYDROEPIANDROSTERONE: CPT

## 2024-08-27 PROCEDURE — 83001 ASSAY OF GONADOTROPIN (FSH): CPT

## 2024-08-27 PROCEDURE — 82024 ASSAY OF ACTH: CPT

## 2024-08-27 PROCEDURE — 80069 RENAL FUNCTION PANEL: CPT

## 2024-08-27 PROCEDURE — 84305 ASSAY OF SOMATOMEDIN: CPT

## 2024-08-27 PROCEDURE — 83002 ASSAY OF GONADOTROPIN (LH): CPT

## 2024-08-27 PROCEDURE — 84146 ASSAY OF PROLACTIN: CPT

## 2024-08-27 PROCEDURE — 84402 ASSAY OF FREE TESTOSTERONE: CPT

## 2024-08-27 PROCEDURE — 36415 COLL VENOUS BLD VENIPUNCTURE: CPT

## 2024-08-27 PROCEDURE — 84443 ASSAY THYROID STIM HORMONE: CPT

## 2024-08-27 PROCEDURE — 84439 ASSAY OF FREE THYROXINE: CPT

## 2024-08-27 PROCEDURE — 99215 OFFICE O/P EST HI 40 MIN: CPT | Performed by: STUDENT IN AN ORGANIZED HEALTH CARE EDUCATION/TRAINING PROGRAM

## 2024-08-27 PROCEDURE — 82533 TOTAL CORTISOL: CPT

## 2024-08-27 RX ORDER — DEXAMETHASONE 1 MG/1
TABLET ORAL
Qty: 1 TABLET | Refills: 0 | Status: SHIPPED | OUTPATIENT
Start: 2024-08-27 | End: 2024-09-06

## 2024-08-27 ASSESSMENT — PAIN SCALES - GENERAL: PAINLEVEL: 0-NO PAIN

## 2024-08-27 NOTE — PATIENT INSTRUCTIONS
Blood work today   Continue Levothyroxine 50 mcg daily except Wednesday take 2 tabs    Dexamethasone suppression test  Take Dexamethasone 1 mg at 10 pm get a good night sleep and labs at 8am    RTC in 3months

## 2024-08-27 NOTE — PROGRESS NOTES
Subjective   Maikel Watters is a 48 y.o. male with history of Vitamin D Deficiency and subclinical hypothyroidism coming in for follow up.     # Subclinical hypothyroidism  Started on LT4 50 mcg daily in May 2021 after TSH: 7.13. Labs repeated in June: TSH: 2.56 and FT4: 1.05  Started Using a CPAP for DONYA. Started feeling better after starting levothyroxine  Last seen in May and at that time change to 50 mcg daily except Wednesday 2 pills   Per patient has been feeling great and active recently better than he ever felt   Most recent blood work shows TSH 1.91    # Low testosterone  In 2018 Had low T and mildly elevated prolactin saw Lorenza Norman. 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 Prolactin 23.3. LH: 0.7FSH: 4.7  TSH: 1.91  MRI sella done in March 2024 6 mm hypoenhancing lesion centered to the left of midline within the sella is nonspecific but may represent a pituitary microadenoma.  Weight: Stable weight  Energy better  No headaches  No galactorrhea  No change in libido  No morning erections   Since CPAP has been having regimented sleep cycle    Latest Reference Range & Units Most Recent   CORTISOL 2.5 - 20.0 ug/dL 13.6  11/3/23 09:21   FOLLICLE STIMULATING HORMONE IU/L 4.7  3/2/24 08:34   Hemoglobin A1C % 5.4  5/3/21 08:54   Parathyroid Hormone, Intact 18.5 - 88.0 pg/mL 41.0  5/11/23 16:13    Thyroxine, Free 0.78 - 1.48 ng/dL 0.96  3/2/24 08:34   Triiodothyronine 60 - 200 ng/dL 130  3/5/21 08:00   Insulin 3 - 25 uIU/mL 15  3/5/21 08:00   LH IU/L 0.7  3/2/24 08:34   PROLACTIN 2.0 - 18.0 ug/L 23.3 (H)  3/2/24 08:34   Thyroid Stimulating Hormone 0.44 - 3.98 mIU/L 1.91  3/2/24 08:34   Vitamin D, 25-Hydroxy, Total ng/mL 63  5/11/23 16:13   DHEA Sulfate 95 - 530 ug/dL 93 (L)  3/2/24 08:34   Estradiol pg/mL 21  7/5/18 08:09   Glucose, Fasting 74 - 99 mg/dL 96  3/5/21 08:00   Testosterone, Free 35.0 - 155.0 pg/mL 21.0 (L)  3/2/24 08:34   GLUCOSE 74 - 99 mg/dL 93  3/2/24 08:34   Cortisol  A.M. 5.0 - 20.0 ug/dL 8.2  5/3/21 08:54   Estimated Average Glucose MG/  5/3/21 08:54   Testosterone, Total, LC-MS/ - 1100 ng/dL 146 (L)  3/2/24 08:34   Thyroglobulin 1.7 - 56.0 ng/mL 441.0 (H)  10/13/18 08:27   Adrenocorticotropic Hormone (ACTH) 7.2 - 63.3 pg/mL 20.6  11/3/23 09:21   (H): Data is abnormally high  (L): Data is abnormally low     Review of Systems  all pertinent systems reviewed and are otherwise negative   Objective   Visit Vitals  /90 (BP Location: Left arm, Patient Position: Sitting, BP Cuff Size: Adult)   Pulse 72   Wt 104 kg (228 lb 12.8 oz)   BMI 31.91 kg/m²   Smoking Status Never   BSA 2.28 m²      Physical Exam  Constitutional:       General: He is not in acute distress.     Appearance: Normal appearance.   Eyes:      Extraocular Movements: Extraocular movements intact.      Pupils: Pupils are equal, round, and reactive to light.   Cardiovascular:      Rate and Rhythm: Normal rate and regular rhythm.   Pulmonary:      Effort: Pulmonary effort is normal. No respiratory distress.      Breath sounds: Normal breath sounds.   Abdominal:      General: Bowel sounds are normal.      Palpations: Abdomen is soft.      Tenderness: There is no abdominal tenderness.   Skin:     Coloration: Skin is not jaundiced or pale.      Findings: No erythema or rash.   Neurological:      General: No focal  deficit present.      Mental Status: He is alert and oriented to person, place, and time.      Deep Tendon Reflexes: Reflexes normal.   Psychiatric:         Mood and Affect: Mood normal.         Behavior: Behavior normal.         Lab Results   Component Value Date    TSH 1.91 03/02/2024    FREET4 0.96 03/02/2024       Assessment/Plan   Subjective   Maikel Watters is a 48 y.o. male with history of Vitamin D Deficiency and subclinical hypothyroidism coming in for follow up.     # Subclinical hypothyroidism  Started on LT4 50 mcg daily in May 2021 after TSH: 7.13. Labs repeated in June: TSH: 2.56 and FT4: 1.05  Started Using a CPAP for DONYA. Started feeling better after starting levothyroxine  Last seen in May and at that time change to 50 mcg daily except Wednesday 2 pills   Per patient has been feeling great and active recently better than he ever felt   Most recent blood work shows TSH 1.91  Plan  Continue Levothyroxine 50 mcg daily except Wednesday take 2 tablets  to be taken on an empty stomach on its own 30min before other meds or food      # Low testosterone  In 2018 Had low T and mildly elevated prolactin saw Lorenza Norman. 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 Prolactin 23.3. LH: 0.7FSH: 4.7  TSH: 1.91  MRI sella done in March 2024 6 mm hypoenhancing lesion centered to the left of midline  within the sella is nonspecific but may represent a pituitary microadenoma.  Since CPAP has been having regimented sleep cycle     Plan:  Blood work today   Dexamethasone suppression test  Take Dexamethasone 1 mg at 10 pm get a good night sleep and labs at 8am    RTC in 3months

## 2024-08-29 ENCOUNTER — LAB (OUTPATIENT)
Dept: LAB | Facility: LAB | Age: 49
End: 2024-08-29
Payer: COMMERCIAL

## 2024-08-29 DIAGNOSIS — D35.2 PITUITARY ADENOMA (MULTI): ICD-10-CM

## 2024-08-29 LAB
CORTIS AM PEAK SERPL-MSCNC: 0.8 UG/DL (ref 5–20)
DHEA-S SERPL-MCNC: 48 UG/DL (ref 95–530)

## 2024-08-29 PROCEDURE — 82533 TOTAL CORTISOL: CPT

## 2024-08-29 PROCEDURE — 82627 DEHYDROEPIANDROSTERONE: CPT

## 2024-08-29 PROCEDURE — 82024 ASSAY OF ACTH: CPT

## 2024-08-29 PROCEDURE — 80375 DRUG/SUBSTANCE NOS 1-3: CPT

## 2024-08-29 RX ORDER — LEVOTHYROXINE SODIUM 50 UG/1
TABLET ORAL
Qty: 102 TABLET | Refills: 3 | Status: SHIPPED | OUTPATIENT
Start: 2024-08-29

## 2024-08-30 LAB
ACTH PLAS-MCNC: 13 PG/ML (ref 7.2–63.3)
ACTH PLAS-MCNC: 3.1 PG/ML (ref 7.2–63.3)
IGF-I SERPL-MCNC: 121 NG/ML (ref 68–225)
IGF-I Z-SCORE SERPL: -0.4

## 2024-09-01 LAB — DEXAMETHASONE SERPL-MCNC: 374.6 NG/DL

## 2024-09-02 LAB
TESTOSTERONE FREE (CHAN): 13.1 PG/ML (ref 35–155)
TESTOSTERONE,TOTAL,LC-MS/MS: 112 NG/DL (ref 250–1100)

## 2024-11-05 ENCOUNTER — LAB REQUISITION (OUTPATIENT)
Dept: LAB | Facility: HOSPITAL | Age: 49
End: 2024-11-05
Payer: COMMERCIAL

## 2024-11-05 DIAGNOSIS — Z02.89 ENCOUNTER FOR OTHER ADMINISTRATIVE EXAMINATIONS: ICD-10-CM

## 2024-11-05 LAB
ALBUMIN SERPL BCP-MCNC: 4.4 G/DL (ref 3.4–5)
ALP SERPL-CCNC: 65 U/L (ref 33–120)
ALT SERPL W P-5'-P-CCNC: 16 U/L (ref 10–52)
AMPHETAMINES UR QL SCN: NORMAL
ANION GAP SERPL CALC-SCNC: 12 MMOL/L (ref 10–20)
AST SERPL W P-5'-P-CCNC: 19 U/L (ref 9–39)
BARBITURATES UR QL SCN: NORMAL
BENZODIAZ UR QL SCN: NORMAL
BILIRUB SERPL-MCNC: 0.5 MG/DL (ref 0–1.2)
BUN SERPL-MCNC: 16 MG/DL (ref 6–23)
BZE UR QL SCN: NORMAL
CALCIUM SERPL-MCNC: 9.8 MG/DL (ref 8.6–10.6)
CANNABINOIDS UR QL SCN: NORMAL
CHLORIDE SERPL-SCNC: 104 MMOL/L (ref 98–107)
CHOLEST SERPL-MCNC: 180 MG/DL (ref 0–199)
CHOLESTEROL/HDL RATIO: 3.1
CO2 SERPL-SCNC: 25 MMOL/L (ref 21–32)
CREAT SERPL-MCNC: 0.84 MG/DL (ref 0.5–1.3)
EGFRCR SERPLBLD CKD-EPI 2021: >90 ML/MIN/1.73M*2
ERYTHROCYTE [DISTWIDTH] IN BLOOD BY AUTOMATED COUNT: 11.9 % (ref 11.5–14.5)
FENTANYL+NORFENTANYL UR QL SCN: NORMAL
GLUCOSE SERPL-MCNC: 93 MG/DL (ref 74–99)
HCT VFR BLD AUTO: 45.9 % (ref 41–52)
HDLC SERPL-MCNC: 58.3 MG/DL
HGB BLD-MCNC: 15 G/DL (ref 13.5–17.5)
LDLC SERPL CALC-MCNC: 106 MG/DL
MCH RBC QN AUTO: 28.2 PG (ref 26–34)
MCHC RBC AUTO-ENTMCNC: 32.7 G/DL (ref 32–36)
MCV RBC AUTO: 86 FL (ref 80–100)
METHADONE UR QL SCN: NORMAL
NON HDL CHOLESTEROL: 122 MG/DL (ref 0–149)
NRBC BLD-RTO: 0 /100 WBCS (ref 0–0)
OPIATES UR QL SCN: NORMAL
OXYCODONE+OXYMORPHONE UR QL SCN: NORMAL
PCP UR QL SCN: NORMAL
PLATELET # BLD AUTO: 204 X10*3/UL (ref 150–450)
POTASSIUM SERPL-SCNC: 4 MMOL/L (ref 3.5–5.3)
PROT SERPL-MCNC: 7.9 G/DL (ref 6.4–8.2)
PSA SERPL-MCNC: 0.61 NG/ML
RBC # BLD AUTO: 5.31 X10*6/UL (ref 4.5–5.9)
SODIUM SERPL-SCNC: 137 MMOL/L (ref 136–145)
TRIGL SERPL-MCNC: 78 MG/DL (ref 0–149)
VLDL: 16 MG/DL (ref 0–40)
WBC # BLD AUTO: 5.8 X10*3/UL (ref 4.4–11.3)

## 2024-11-05 PROCEDURE — 84153 ASSAY OF PSA TOTAL: CPT

## 2024-11-05 PROCEDURE — 80053 COMPREHEN METABOLIC PANEL: CPT

## 2024-11-05 PROCEDURE — 80307 DRUG TEST PRSMV CHEM ANLYZR: CPT

## 2024-11-05 PROCEDURE — 80061 LIPID PANEL: CPT

## 2024-11-05 PROCEDURE — 85027 COMPLETE CBC AUTOMATED: CPT

## 2024-11-06 ENCOUNTER — LAB REQUISITION (OUTPATIENT)
Dept: LAB | Facility: HOSPITAL | Age: 49
End: 2024-11-06
Payer: COMMERCIAL

## 2024-11-06 DIAGNOSIS — Z02.89 ENCOUNTER FOR OTHER ADMINISTRATIVE EXAMINATIONS: ICD-10-CM

## 2024-11-06 LAB
APPEARANCE UR: CLEAR
BILIRUB UR STRIP.AUTO-MCNC: NEGATIVE MG/DL
COLOR UR: NORMAL
GLUCOSE UR STRIP.AUTO-MCNC: NORMAL MG/DL
KETONES UR STRIP.AUTO-MCNC: NEGATIVE MG/DL
LEUKOCYTE ESTERASE UR QL STRIP.AUTO: NEGATIVE
NITRITE UR QL STRIP.AUTO: NEGATIVE
PH UR STRIP.AUTO: 7 [PH]
PROT UR STRIP.AUTO-MCNC: NEGATIVE MG/DL
RBC # UR STRIP.AUTO: NEGATIVE /UL
SP GR UR STRIP.AUTO: 1.02
UROBILINOGEN UR STRIP.AUTO-MCNC: NORMAL MG/DL

## 2024-11-06 PROCEDURE — 81003 URINALYSIS AUTO W/O SCOPE: CPT

## 2024-11-26 ENCOUNTER — APPOINTMENT (OUTPATIENT)
Dept: ENDOCRINOLOGY | Facility: CLINIC | Age: 49
End: 2024-11-26
Payer: COMMERCIAL

## 2024-11-26 ENCOUNTER — LAB (OUTPATIENT)
Dept: LAB | Facility: LAB | Age: 49
End: 2024-11-26
Payer: COMMERCIAL

## 2024-11-26 VITALS
SYSTOLIC BLOOD PRESSURE: 165 MMHG | DIASTOLIC BLOOD PRESSURE: 90 MMHG | RESPIRATION RATE: 20 BRPM | WEIGHT: 235 LBS | BODY MASS INDEX: 32.78 KG/M2 | TEMPERATURE: 97.8 F

## 2024-11-26 DIAGNOSIS — E03.9 HYPOTHYROIDISM, UNSPECIFIED TYPE: Primary | ICD-10-CM

## 2024-11-26 DIAGNOSIS — E03.9 HYPOTHYROIDISM, UNSPECIFIED TYPE: ICD-10-CM

## 2024-11-26 DIAGNOSIS — E66.811 CLASS 1 OBESITY WITH BODY MASS INDEX (BMI) OF 30.0 TO 30.9 IN ADULT, UNSPECIFIED OBESITY TYPE, UNSPECIFIED WHETHER SERIOUS COMORBIDITY PRESENT: ICD-10-CM

## 2024-11-26 LAB
T4 FREE SERPL-MCNC: 1.02 NG/DL (ref 0.78–1.48)
TSH SERPL-ACNC: 2.21 MIU/L (ref 0.44–3.98)

## 2024-11-26 PROCEDURE — 99214 OFFICE O/P EST MOD 30 MIN: CPT | Performed by: STUDENT IN AN ORGANIZED HEALTH CARE EDUCATION/TRAINING PROGRAM

## 2024-11-26 PROCEDURE — 36415 COLL VENOUS BLD VENIPUNCTURE: CPT

## 2024-11-26 PROCEDURE — 84443 ASSAY THYROID STIM HORMONE: CPT

## 2024-11-26 PROCEDURE — 84439 ASSAY OF FREE THYROXINE: CPT

## 2024-11-26 NOTE — PATIENT INSTRUCTIONS
Continue Levothyroxine 50 mcg daily except Wednesday take 2 tablets  to be taken on an empty stomach on its own 30min before other meds or food     Blood work today  Dietician Hansa Blood    RTCALE in 4 months

## 2024-11-26 NOTE — PROGRESS NOTES
Subjective   Maikel Watters is a 48 y.o. male with history of Vitamin D Deficiency and subclinical hypothyroidism coming in for follow up.     # Subclinical hypothyroidism  Started on LT4 50 mcg daily in May 2021 after TSH: 7.13. Labs repeated in June: TSH: 2.56 and FT4: 1.05  Started Using a CPAP for DONYA. Started feeling better after starting levothyroxine  Last seen in May and at that time change to 50 mcg daily except Wednesday 2 pills   Per patient has been feeling great and active recently better than he ever felt   Most recent blood work shows TSH 1.73     # Low testosterone  In 2018 Had low T and mildly elevated prolactin saw Lorenza Norman. 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 Prolactin 23.3. LH: 0.7FSH: 4.7  TSH: 1.91  MRI sella done in March 2024 6 mm hypoenhancing lesion centered to the left of midline within the sella is nonspecific but may represent a pituitary microadenoma.  Since CPAP has been having regimented sleep cycle   Since October   Different  for levothyroxine still white   Slightly lower than usual  Usual bed time 9 pm  Just when he feels tired he needs to sleep apnea  Struggling with weight  Activity: volley ball, hiking 4 mile, swimming 5-6 days a week  Gained few lbs    No weight loss meds  Tried mediterranean diet and low carb  No constipation  Feels  something is off this month and last month    Review of Systems  all pertinent systems reviewed and are otherwise negative   Objective   /90   Temp 36.6 °C (97.8 °F)   Resp 20   Wt 107 kg (235 lb)   BMI 32.78 kg/m²    Physical Exam  Constitutional:       General: He is not in acute distress.     Appearance: Normal appearance. He is obese.   HENT:      Head: Normocephalic and atraumatic.   Eyes:      Extraocular Movements: Extraocular movements intact.      Pupils: Pupils are equal, round, and reactive to light.   Cardiovascular:      Rate and Rhythm: Normal rate and regular rhythm.   Pulmonary:      Effort: Pulmonary effort is normal. No respiratory distress.      Breath sounds: Normal breath sounds.   Abdominal:      General: Bowel sounds are normal.      Palpations: Abdomen is soft.      Tenderness: There is no abdominal tenderness.   Skin:     Coloration: Skin is not jaundiced or pale.      Findings: No erythema or rash.   Neurological:      General: No focal deficit present.      Mental Status: He is alert and oriented to person, place, and time.      Deep Tendon Reflexes: Reflexes normal.   Psychiatric:         Mood and Affect: Mood normal.         Behavior: Behavior normal.         Lab Results   Component Value Date    TSH 1.73 08/27/2024    FREET4 1.06 08/27/2024       Assessment/Plan   Maikel Watters is a 48 y.o. male with history of Vitamin D Deficiency and subclinical hypothyroidism coming in for follow up.     # Subclinical hypothyroidism  Started on LT4 50 mcg daily in May 2021 after TSH: 7.13. Labs repeated in June: TSH: 2.56 and FT4: 1.05  Started Using a CPAP for DONYA. Started feeling better after starting levothyroxine  Last seen in May and at that time change to 50 mcg daily except Wednesday 2 pills   Per patient has been feeling great and active recently better than he ever felt   Most recent blood work shows TSH 1.73 in August  Since October :Different  for levothyroxine still  white   Energy: Slightly lower than usual  Plan  Continue Levothyroxine 50 mcg daily except Wednesday take 2 tablets  to be taken on an empty stomach on its own 30min before other meds or food   Blood work     # Low testosterone  In 2018 Had low T and mildly elevated prolactin saw Lorenza Norman. 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 Prolactin 23.3. LH: 0.7FSH: 4.7  TSH: 1.91  MRI sella done in March 2024 6 mm hypoenhancing lesion centered to the left of midline within the sella is nonspecific but may represent a pituitary microadenoma.  Since CPAP has been having regimented sleep cycle   Usual bed time 9 pm  Just when he feels tired he needs to sleep apnea  Struggling with weight  Activity: volley ball, hiking 4 mile, swimming 5-6 days a week  Gained few lbs  No weight loss meds  Tried mediterranean diet and low carb  Plan:  Discussed with patient importance of weight loss to help with low T  Dietician Hansa Blood    [facility]ALE in 4 months

## 2025-01-02 NOTE — PROGRESS NOTES
Nutrition: Initial Assessment    Maikel Watters is a 49 y.o. male being seen in office who was referred by Dr. iWlliam Gerard on 11/26/24 for   1. Hypothyroidism, unspecified type  Referral to Nutrition Services      2. Class 1 obesity with body mass index (BMI) of 30.0 to 30.9 in adult, unspecified obesity type, unspecified whether serious comorbidity present  Referral to Nutrition Services          Nutrition Assessment    Food & Nutrition Related History: Pt would like to discuss wt loss. He lost wt when he started taking levothyroxine but wt loss has plateaued. He has been using a food diary to track his intake the last couple weeks which he brings today. Pt keeps his food in a separate fridge in the garage. He says this helps him not to overeat on the items that his family members purchase as there is a clear boundary on what is considered his food. He works at [employer].     Allergies: None  Intolerance: only eats egg cooked in something   Appetite: Fluctuates - often skips meals   Intake: 50-75%  GI Symptoms : None  Swallowing Difficulty: No problems with swallowing  Dentition : own  Food Preparation: Patient and Children  Cooking Skills/Barriers: None reported  Grocery Shopping: Patient and Children  - grocery shopping every Sunday night   Supplements: Multivitamin, Vitamin D, and Calcium daily; occ takes papaya extract for upset stomach   Food Insecurity: Denies     Dietary Recall:   Meal 1: orange juice (8 oz), banana // Yoplait flavored yogurt // 3 pieces elise   Meal 2: corned beef, spinach with ranch dressing   - does not get much time for lunch, not reliable access to microwave   Meal 3: soft taco with chicken     Averaging 9397-0566 calories, 53g protein     Snacks: hummus, peanuts   Beverages: water, OJ, fish milk (almost daily)   Eating out: 2x per week, normally lunch (Korean or Chipotle salad)   Alcohol Intake: none   Self-Identified Challenges: time to prepare food     Physical Activity:   Volleyball at  work 2-3x per week during summer  Softball team 2x per week during summer   During the winter, goes to [facility] fairly frequently     Labs:  CMP trend:    Recent Labs     11/05/24  0845 08/27/24  1047 03/02/24  0834 11/08/23  0930 05/11/23  1613 10/31/22  0000   GLUCOSE 93 87 93 95   < > 93    138 141 136   < > 139   K 4.0 4.1 4.4 4.2   < > 4.3    106 106 105   < > 107   CO2 25 24 26 25   < > 25   ANIONGAP 12 12 13 10   < > 11   BUN 16 16 15 14   < > 14   CREATININE 0.84 0.86 0.92 0.79   < > 0.83   EGFR >90 >90 >90 >90   < >  --    CALCIUM 9.8 9.5 10.0 9.9   < > 9.4   ALBUMIN 4.4 4.2 4.5 4.3   < > 4.1   ALKPHOS 65  --   --  58  --  60   PROT 7.9  --   --  8.0  --  7.8   AST 19  --   --  20  --  21   BILITOT 0.5  --   --  0.6  --  0.6   ALT 16  --   --  11  --  10    < > = values in this interval not displayed.     Lipid Panel trend:    Recent Labs     11/05/24  0845 11/08/23  0930 10/31/22  0000 09/21/20  0852 11/20/19  0000   CHOL 180 168 171 169 162   HDL 58.3 49.2 55.5 44.3 47.4   LDLCALC 106* 106*  --   --   --    LDLF  --   --  104* 105* 96   VLDL 16 13 11 20 18   TRIG 78 64 56 101 91     Vit D:   Lab Results   Component Value Date    VITD25 63 05/11/2023     Vit B12:   Lab Results   Component Value Date    GPSSAPSA03 507 06/15/2018     DM specific labs:   Recent Labs     05/03/21  0854 06/15/18  0913   HGBA1C 5.4 5.0       Medications:  Current Outpatient Medications   Medication Instructions    albuterol 90 mcg/actuation inhaler 2 puffs, inhalation, Every 4 hours PRN    ascorbic acid, vitamin C, 500 mg capsule oral    calcium carbonate-vitamin D3 500 mg-3.125 mcg (125 unit) tablet tablet 1 tablet, oral, 2 times daily    cholecalciferol (VITAMIN D-3) 5,000 Units, oral, Daily    cholecalciferol (Vitamin D3) 5,000 Units tablet oral    dexAMETHasone (Decadron) 1 mg tablet Take 1 tablet when directed    levothyroxine (Synthroid, Levoxyl) 50 mcg tablet Take 1 tablet daily in the am on empty stomach as  "directed. Extra half on Wednesday and Sunday    multivitamin with minerals (Adults Multivitamin) tablet 1 tablet, oral, Daily    polyethylene glycol-electrolytes (polyethylene glycol) 420 gram solution oral        Nutrition Focused Physical Exam:  Performed/Deferred: Deferred as pt visually appears well-nourished with no signs of malnutrition      Past Medical History:  Patient Active Problem List   Diagnosis    Abnormal blood chemistry level    Abnormal thyroid screen (blood)    Allergic reaction    Deficiency of testosterone biosynthesis    Obesity    Closed nondisplaced fracture of middle phalanx of left little finger    Elevated prolactin level    Daytime somnolence    Fracture of phalanx of finger    Hypoglycemia    Hypogonadism in male    Hypothyroidism    Idiopathic gynecomastia    Obstructive sleep apnea syndrome    Pseudoangiomatous stromal hyperplasia of breast    Reactive airway disease (HHS-HCC)    Sleep disorder breathing    Stiffness of finger joint    Tachycardia    Vitamin D deficiency    Blood in stool    Thrombocytopenia (CMS-HCC)    Sore throat    Hematochezia    Foreign body sensation in throat    Foreign body in throat, initial encounter    Breast pain    Acute bronchitis    Other specified health status    Testicular hypofunction    Pain in left finger(s)        Anthropometrics:  Ht Readings from Last 1 Encounters:   01/03/25 1.803 m (5' 11\")     BMI Readings from Last 1 Encounters:   01/03/25 32.78 kg/m²     Wt Readings from Last 10 Encounters:   11/26/24 107 kg (235 lb)   08/27/24 104 kg (228 lb 12.8 oz)   07/22/24 105 kg (231 lb 6.4 oz)   03/26/24 99.8 kg (220 lb)   03/08/24 99.8 kg (220 lb)   11/10/23 98.8 kg (217 lb 14.4 oz)   08/18/23 98.9 kg (218 lb)   08/10/23 99.8 kg (220 lb 0.3 oz)   06/22/23 100 kg (221 lb 4.8 oz)   05/10/23 102 kg (224 lb 6.4 oz)       Estimated Nutrition Needs:    Total Energy Estimated Needs (kCal): 1800 kCal   Method for Estimating Needs: MSJ 1948 x 1.2 - 500 " (for weight loss)   Total Protein Estimated Needs (g): 85 g   Total Protein Estimated Needs (g/kg): 0.8 g/kg    Nutrition Diagnosis     Patient has Nutrition Diagnosis: Yes Diagnosis Status (1): New  Nutrition Diagnosis 1: Food and nutrition related knowledge deficit Related to (1): lack of adequate prior exposure to information As Evidenced by (1): patient has questions about changing diet.       Nutrition Interventions/Recommendations   FOOD & NUTRIENT DELIVERY: General Healthy Diet     NUTRITION COUNSELING: Goal Setting, Motivational Interviewing     COORDINATION OF CARE:  None     NUTRITION EDUCATION:   Explained that eating too few calories can lead to the slowing down of resting metabolism as the body burns fewer calories to conserve energy. This can make it more difficult to lose weight or maintain weight loss. In addition, muscle mass is often lost instead of adipose. Encouraged patient to try a less restrictive calorie deficit, aiming for 6676-2973 calories per day (compared to current intake of 6418-0983 kcals/day).   Encouraged patient to focus on eating more protein. Discussed gradually increasing protein until he reaches goal of 80-90 grams. Talked about sources of lean protein, such as boneless, skinless chicken breast, turkey, fish, beans, lentils, nuts, seeds, etc.   Answered patient's specific questions about products he has been eating (yoplait yogurt, dried cranberries). Reviewed labels and provided education.   Discussed trying to eat calories instead of drinking them. Advised patient to switch out oranges instead of OJ and find a higher protein snack, such as peanuts, instead of drinking chocolate milk daily.   Answered patient's questions about probiotics. Talked about how there is some research on benefits but it is difficult to make a clinical recommendation based off the current state of research. Discussed that it would be fine to try a probiotic to see if it offers any benefit.   Emailed  Mediterranean Booklet for additional guidance on food ideas, recipes, meal preparation, etc.     Educational Handouts: Mediterranean Meal Plan Booklet    *Patient expressed understanding of the education provided and denied any additional questions/concerns.       Nutrition Monitoring and Evaluation   Food/Nutrient Related History Monitoring  Monitoring and Evaluation Plan: Meal/snack pattern, Energy intake, Protein intake  Energy Intake: Estimated energy intake  Criteria: 8074-7854 kcals/day  Meal/Snack Pattern: Estimated meal and snack pattern  Criteria: Work towards 3 balanced meals per day  Estimated protein intake: Estimated protein intake  Criteria: 80-90 grams of protein daily         Body Composition/Growth/Weight History  Monitoring and Evaluation Plan: Weight  Weight: Measured weight  Criteria: Intentional weight loss of 0.5-2 lb per week, trending toward a clinically significant weight loss of 5-10% of current body weight.                      Readiness to Change : Excellent  Level of Understanding : Excellent  Anticipated Compliant : Excellent     Follow-up: 4-6 weeks

## 2025-01-03 ENCOUNTER — NUTRITION (OUTPATIENT)
Dept: NUTRITION | Facility: CLINIC | Age: 50
End: 2025-01-03
Payer: COMMERCIAL

## 2025-01-03 VITALS — HEIGHT: 71 IN | BODY MASS INDEX: 32.78 KG/M2

## 2025-01-03 DIAGNOSIS — E66.811 CLASS 1 OBESITY WITH BODY MASS INDEX (BMI) OF 30.0 TO 30.9 IN ADULT, UNSPECIFIED OBESITY TYPE, UNSPECIFIED WHETHER SERIOUS COMORBIDITY PRESENT: ICD-10-CM

## 2025-01-03 DIAGNOSIS — E03.9 HYPOTHYROIDISM, UNSPECIFIED TYPE: ICD-10-CM

## 2025-01-03 DIAGNOSIS — Z71.3 DIETARY COUNSELING AND SURVEILLANCE: Primary | ICD-10-CM

## 2025-01-03 PROCEDURE — 97802 MEDICAL NUTRITION INDIV IN: CPT

## 2025-01-03 NOTE — PATIENT INSTRUCTIONS
I recommend aiming for 7900-2940 calories per day.   Aim for 80-90 grams of protein. You can gradually increase protein. Aim for 60+ to start.   Aim to eat your calories and not drink them.  Replace oranges for OJ.   Find a different snack instead of fish milk.   Schedule a follow-up visit for end of February/early March.

## 2025-01-31 ENCOUNTER — APPOINTMENT (OUTPATIENT)
Dept: NUTRITION | Facility: CLINIC | Age: 50
End: 2025-01-31
Payer: COMMERCIAL

## 2025-02-21 ENCOUNTER — NUTRITION (OUTPATIENT)
Dept: NUTRITION | Facility: CLINIC | Age: 50
End: 2025-02-21
Payer: COMMERCIAL

## 2025-02-21 VITALS — WEIGHT: 228 LBS | BODY MASS INDEX: 31.8 KG/M2

## 2025-02-21 DIAGNOSIS — E03.9 HYPOTHYROIDISM, UNSPECIFIED TYPE: ICD-10-CM

## 2025-02-21 DIAGNOSIS — Z71.3 DIETARY COUNSELING AND SURVEILLANCE: Primary | ICD-10-CM

## 2025-02-21 DIAGNOSIS — E66.811 CLASS 1 OBESITY WITH BODY MASS INDEX (BMI) OF 30.0 TO 30.9 IN ADULT, UNSPECIFIED OBESITY TYPE, UNSPECIFIED WHETHER SERIOUS COMORBIDITY PRESENT: ICD-10-CM

## 2025-02-21 PROCEDURE — 97803 MED NUTRITION INDIV SUBSEQ: CPT

## 2025-02-21 NOTE — PROGRESS NOTES
Nutrition: Follow-up    Maikel Watters is a 49 y.o. male being seen in office who was referred by Dr. William Gerard on 11/26/24 for   1. Dietary counseling and surveillance        2. Class 1 obesity with body mass index (BMI) of 30.0 to 30.9 in adult, unspecified obesity type, unspecified whether serious comorbidity present        3. Hypothyroidism, unspecified type          Visit 1: 1/3/25    Nutrition Assessment    Food & Nutrition Related History:   2/21/25:   Pt is now limiting fruit juice and fish milk. Trying to focus on more protein. Brings food log and list of questions today.     1/3/25:   Pt would like to discuss wt loss. He lost wt when he started taking levothyroxine but wt loss has plateaued. He has been using a food diary to track his intake the last couple weeks which he brings today. Pt keeps his food in a separate fridge in the garage. He says this helps him not to overeat on the items that his family members purchase as there is a clear boundary on what is considered his food. He works at [employer].     Allergies: None  Intolerance: only eats egg cooked in something   Appetite: Fluctuates - often skips meals   Intake: 50-75%  GI Symptoms : None  Swallowing Difficulty: No problems with swallowing  Dentition : own  Food Preparation: Patient and Children  Cooking Skills/Barriers: None reported  Grocery Shopping: Patient and Children  - grocery shopping every Sunday night   Supplements: Multivitamin, Vitamin D, and Calcium daily; occ takes papaya extract for upset stomach   Food Insecurity: Denies     Dietary Recall:   Meal 1: mandarin oranges // Yoplait flavored yogurt // 3 pieces elise   Meal 2: Quarter pounder from [employer]  - does not get much time for lunch, not reliable access to microwave   Meal 3: Beef and Rice Bowl with peppers , fresh date  Snack: fudgesicle, peanuts, sparkling water, raspberry strudel       Ranging between 925-1800 kcals   Ranging between 24-80 grams of protein     Snacks:  hummus, peanuts   Beverages: water   Eating out: 2x per week, normally lunch (Korean or Chipotle salad)   Alcohol Intake: none   Self-Identified Challenges: time to prepare food     Physical Activity:   Volleyball at work 2-3x per week during summer  Softball team 2x per week during summer   During the winter, goes to [facility] fairly frequently     Labs:  CMP trend:    Recent Labs     11/05/24  0845 08/27/24  1047 03/02/24  0834 11/08/23  0930 05/11/23  1613 10/31/22  0000   GLUCOSE 93 87 93 95   < > 93    138 141 136   < > 139   K 4.0 4.1 4.4 4.2   < > 4.3    106 106 105   < > 107   CO2 25 24 26 25   < > 25   ANIONGAP 12 12 13 10   < > 11   BUN 16 16 15 14   < > 14   CREATININE 0.84 0.86 0.92 0.79   < > 0.83   EGFR >90 >90 >90 >90   < >  --    CALCIUM 9.8 9.5 10.0 9.9   < > 9.4   ALBUMIN 4.4 4.2 4.5 4.3   < > 4.1   ALKPHOS 65  --   --  58  --  60   PROT 7.9  --   --  8.0  --  7.8   AST 19  --   --  20  --  21   BILITOT 0.5  --   --  0.6  --  0.6   ALT 16  --   --  11  --  10    < > = values in this interval not displayed.     Lipid Panel trend:    Recent Labs     11/05/24  0845 11/08/23  0930 10/31/22  0000 09/21/20  0852 11/20/19  0000   CHOL 180 168 171 169 162   HDL 58.3 49.2 55.5 44.3 47.4   LDLCALC 106* 106*  --   --   --    LDLF  --   --  104* 105* 96   VLDL 16 13 11 20 18   TRIG 78 64 56 101 91     Vit D:   Lab Results   Component Value Date    VITD25 63 05/11/2023     Vit B12:   Lab Results   Component Value Date    UITDECFV78 507 06/15/2018     DM specific labs:   Recent Labs     05/03/21  0854 06/15/18  0913   HGBA1C 5.4 5.0       Medications:  Current Outpatient Medications   Medication Instructions    albuterol 90 mcg/actuation inhaler 2 puffs, inhalation, Every 4 hours PRN    ascorbic acid, vitamin C, 500 mg capsule oral    calcium carbonate-vitamin D3 500 mg-3.125 mcg (125 unit) tablet tablet 1 tablet, oral, 2 times daily    cholecalciferol (VITAMIN D-3) 5,000 Units, oral, Daily  "   cholecalciferol (Vitamin D3) 5,000 Units tablet oral    dexAMETHasone (Decadron) 1 mg tablet Take 1 tablet when directed    levothyroxine (Synthroid, Levoxyl) 50 mcg tablet Take 1 tablet daily in the am on empty stomach as directed. Extra half on Wednesday and Sunday    multivitamin with minerals (Adults Multivitamin) tablet 1 tablet, oral, Daily    polyethylene glycol-electrolytes (polyethylene glycol) 420 gram solution oral        Nutrition Focused Physical Exam:  Performed/Deferred: Deferred as pt visually appears well-nourished with no signs of malnutrition      Past Medical History:  Patient Active Problem List   Diagnosis    Abnormal blood chemistry level    Abnormal thyroid screen (blood)    Allergic reaction    Deficiency of testosterone biosynthesis    Obesity    Closed nondisplaced fracture of middle phalanx of left little finger    Elevated prolactin level    Daytime somnolence    Fracture of phalanx of finger    Hypoglycemia    Hypogonadism in male    Hypothyroidism    Idiopathic gynecomastia    Obstructive sleep apnea syndrome    Pseudoangiomatous stromal hyperplasia of breast    Reactive airway disease (HHS-HCC)    Sleep disorder breathing    Stiffness of finger joint    Tachycardia    Vitamin D deficiency    Blood in stool    Thrombocytopenia (CMS-HCC)    Sore throat    Hematochezia    Foreign body sensation in throat    Foreign body in throat, initial encounter    Breast pain    Acute bronchitis    Other specified health status    Testicular hypofunction    Pain in left finger(s)        Anthropometrics:  Ht Readings from Last 1 Encounters:   01/03/25 1.803 m (5' 11\")     BMI Readings from Last 1 Encounters:   02/21/25 31.80 kg/m²     Wt Readings from Last 10 Encounters:   02/21/25 103 kg (228 lb)   11/26/24 107 kg (235 lb)   08/27/24 104 kg (228 lb 12.8 oz)   07/22/24 105 kg (231 lb 6.4 oz)   03/26/24 99.8 kg (220 lb)   03/08/24 99.8 kg (220 lb)   11/10/23 98.8 kg (217 lb 14.4 oz)   08/18/23 98.9 " kg (218 lb)   08/10/23 99.8 kg (220 lb 0.3 oz)   06/22/23 100 kg (221 lb 4.8 oz)     Weight Change: Wt loss of 7 lbs (3%) x 3 months from 11/26/24 to 2/21/25    Estimated Nutrition Needs:    Total Energy Estimated Needs in 24 hours (kCal): 1800 kCal   Method for Estimating Needs: MSJ 1948 x 1.2 - 500 (for weight loss)   Total Protein Estimated Needs in 24 Hours (g): 85 g   Protein Estimated Needs per kg Body Weight in 24 Hours (g/kg): 0.8 g/kg    Nutrition Diagnosis     Patient has Nutrition Diagnosis: Yes Diagnosis Status (1): Active  Nutrition Diagnosis 1: Food and nutrition related knowledge deficit Related to (1): lack of adequate prior exposure to information As Evidenced by (1): patient has questions about changing diet.       Nutrition Interventions/Recommendations   FOOD & NUTRIENT DELIVERY: General Healthy Diet     NUTRITION COUNSELING: Goal Setting, Motivational Interviewing     COORDINATION OF CARE:  None     NUTRITION EDUCATION:   Discussed adequate calories and protein goals. Encouraged continuing to increase protein as patient is still not meeting goal. Discussed importance of meal consistency.   Suggested high protein snack ideas to try, such as dry roasted edamame or nuts.   Answered patient's general nutrition questions to his satisfaction.     *Patient expressed understanding of the education provided and denied any additional questions/concerns.       Nutrition Monitoring and Evaluation   Food and Nutrient Intake  Monitoring and Evaluation Plan: Meal/snack pattern, Energy intake, Protein intake  Energy Intake: Estimated energy intake  Criteria: 4937-5726 kcals/day  Meal/Snack Pattern: Estimated meal and snack pattern  Criteria: Work towards 3 balanced meals per day  Estimated protein intake: Estimated protein intake  Criteria: 80-90 grams of protein daily         Anthropometric measurements  Monitoring and Evaluation Plan: Weight  Criteria: Intentional weight loss of 0.5-2 lb per week, trending  toward a clinically significant weight loss of 5-10% of current body weight.                      Readiness to Change : Excellent  Level of Understanding : Excellent  Anticipated Compliant : Excellent     Follow-up: 2-3 months

## 2025-03-27 ENCOUNTER — APPOINTMENT (OUTPATIENT)
Facility: CLINIC | Age: 50
End: 2025-03-27
Payer: COMMERCIAL

## 2025-03-27 VITALS
BODY MASS INDEX: 31.64 KG/M2 | TEMPERATURE: 97.7 F | HEIGHT: 71 IN | HEART RATE: 71 BPM | SYSTOLIC BLOOD PRESSURE: 146 MMHG | DIASTOLIC BLOOD PRESSURE: 89 MMHG | WEIGHT: 226 LBS

## 2025-03-27 DIAGNOSIS — E29.1 HYPOGONADISM IN MALE: ICD-10-CM

## 2025-03-27 DIAGNOSIS — E66.811 CLASS 1 OBESITY WITH BODY MASS INDEX (BMI) OF 30.0 TO 30.9 IN ADULT, UNSPECIFIED OBESITY TYPE, UNSPECIFIED WHETHER SERIOUS COMORBIDITY PRESENT: ICD-10-CM

## 2025-03-27 DIAGNOSIS — E03.9 HYPOTHYROIDISM, UNSPECIFIED TYPE: Primary | ICD-10-CM

## 2025-03-27 DIAGNOSIS — E03.8 OTHER SPECIFIED HYPOTHYROIDISM: ICD-10-CM

## 2025-03-27 PROCEDURE — 3008F BODY MASS INDEX DOCD: CPT | Performed by: STUDENT IN AN ORGANIZED HEALTH CARE EDUCATION/TRAINING PROGRAM

## 2025-03-27 PROCEDURE — 99214 OFFICE O/P EST MOD 30 MIN: CPT | Performed by: STUDENT IN AN ORGANIZED HEALTH CARE EDUCATION/TRAINING PROGRAM

## 2025-03-27 RX ORDER — LEVOTHYROXINE SODIUM 50 UG/1
TABLET ORAL
Qty: 102 TABLET | Refills: 3 | Status: SHIPPED | OUTPATIENT
Start: 2025-03-27

## 2025-03-27 NOTE — PROGRESS NOTES
Subjective   Maikel Watters is a 48 y.o. male with history of Vitamin D Deficiency and subclinical hypothyroidism coming in for follow up.     # Subclinical hypothyroidism  Started on LT4 50 mcg daily in May 2021 after TSH: 7.13. Labs repeated in June: TSH: 2.56 and FT4: 1.05  Started Using a CPAP for DONYA. Started feeling better after starting levothyroxine  Last seen in May and at that time change to 50 mcg daily except Wednesday 2 pills   Per patient has been feeling great and active recently better than he ever felt   Most recent blood work shows TSH 1.73 in August  Since October :Different  for levothyroxine still white   Energy: Slightly lower than usual     Saw Pauly and has been watching his diet adding more protein and following mediterranean   Stress from work  Working out on the weekend  Walks every day to the cafeteria  Never had Gcs before  DHEAs on the lower side    # Low testosterone  In 2018 Had low T and mildly elevated prolactin saw Lorenza Norman. 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 Prolactin 23.3. LH: 0.7FSH: 4.7  TSH: 1.91  MRI sella done in March 2024 6 mm hypoenhancing lesion centered to the left of midline within the sella is nonspecific but may represent a pituitary microadenoma.  Since CPAP has been having regimented sleep cycle   Usual bed time 9 pm  Just  "when he feels tired he needs to sleep apnea  Struggling with weight  Activity: volley ball, hiking 4 mile, swimming 5-6 days a week  Gained few lbs  No weight loss meds  Tried mediterranean diet and low carb  Review of Systems  all pertinent systems reviewed and are otherwise negative   Objective   /89 (BP Location: Right arm, Patient Position: Sitting, BP Cuff Size: Large adult)   Pulse 71   Temp 36.5 °C (97.7 °F)   Ht 1.803 m (5' 11\")   Wt 103 kg (226 lb)   BMI 31.52 kg/m²    Physical Exam  Constitutional:       General: He is not in acute distress.     Appearance: Normal appearance. He is obese.   HENT:      Head: Normocephalic and atraumatic.   Eyes:      Extraocular Movements: Extraocular movements intact.      Pupils: Pupils are equal, round, and reactive to light.   Cardiovascular:      Rate and Rhythm: Normal rate and regular rhythm.   Pulmonary:      Effort: Pulmonary effort is normal. No respiratory distress.      Breath sounds: Normal breath sounds.   Abdominal:      General: Bowel sounds are normal.      Palpations: Abdomen is soft.      Tenderness: There is no abdominal tenderness.   Skin:     Coloration: Skin is not jaundiced or pale.      Findings: No erythema or rash.   Neurological:      General: No focal deficit present.      Mental Status: He is alert and oriented to person, place, and time.      Deep Tendon Reflexes: Reflexes normal.   Psychiatric:         Mood and Affect: Mood normal.         Behavior: Behavior normal.         Lab Results   Component Value Date    TSH 2.21 11/26/2024    FREET4 1.02 11/26/2024       Assessment/Plan   Maikel Watters is a 48 y.o. male with history of Vitamin D Deficiency and subclinical hypothyroidism coming in for follow up.     # Subclinical hypothyroidism  Started on LT4 50 mcg daily in May 2021 after TSH: 7.13. Labs repeated in June: TSH: 2.56 and FT4: 1.05  Started Using a CPAP for DONYA. Started feeling better after starting levothyroxine  Last seen in " May and at that time change to 50 mcg daily except Wednesday 2 pills   Per patient has been feeling great and active recently better than he ever felt   Most recent blood work shows TSH 1.73 in August  Since October :Different  for levothyroxine still white   Energy: Slightly lower than usual  Plan  Continue Levothyroxine 50 mcg daily except Wednesday take 2 tablets  to be taken on an empty stomach on its own 30min before other meds or food   Blood work     Saw Pauly and has been watching his diet adding more protein and following mediterranean   Stress from work  Working out on the weekend  Walks every day to the cafeteria  Never had Gcs before  DHEAs on the lower side    # Low testosterone  In 2018 Had low T and mildly elevated prolactin saw Lorenza Norman. 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 Prolactin 23.3. LH: 0.7FSH: 4.7  TSH: 1.91  MRI sella done in March 2024 6 mm hypoenhancing lesion centered to the left of midline within the sella is nonspecific but may represent a pituitary microadenoma.  Since CPAP has been having regimented sleep cycle   Usual bed time 9 pm  Just when he feels tired he needs to sleep apnea  Struggling with weight  Activity: volley ball, hiking 4 mile, swimming 5-6 days a week  Gained few lbs  No weight loss meds  Tried mediterranean diet and low  carb  Plan:  Discussed with patient importance of weight loss to help with low T  Dietician Hansa Blood     RTC in 6months

## 2025-03-27 NOTE — PATIENT INSTRUCTIONS
Continue Levothyroxine 50 mcg daily except Wednesday take 2 tablets  to be taken on an empty stomach on its own 30min before other meds or food   Blood work    Let me know if you are losing weight  We will order a full blood work before next apt    RTC in 6 months

## 2025-03-28 LAB
T4 FREE SERPL-MCNC: 1 NG/DL (ref 0.8–1.8)
TSH SERPL-ACNC: 1.89 MIU/L (ref 0.4–4.5)

## 2025-05-02 ENCOUNTER — NUTRITION (OUTPATIENT)
Dept: NUTRITION | Facility: CLINIC | Age: 50
End: 2025-05-02
Payer: COMMERCIAL

## 2025-05-02 DIAGNOSIS — Z71.3 DIETARY COUNSELING AND SURVEILLANCE: Primary | ICD-10-CM

## 2025-05-02 DIAGNOSIS — E66.811 CLASS 1 OBESITY WITH BODY MASS INDEX (BMI) OF 30.0 TO 30.9 IN ADULT, UNSPECIFIED OBESITY TYPE, UNSPECIFIED WHETHER SERIOUS COMORBIDITY PRESENT: ICD-10-CM

## 2025-05-02 DIAGNOSIS — E03.9 HYPOTHYROIDISM, UNSPECIFIED TYPE: ICD-10-CM

## 2025-05-02 PROCEDURE — 97803 MED NUTRITION INDIV SUBSEQ: CPT

## 2025-05-02 NOTE — PROGRESS NOTES
Nutrition: Follow-up    Maikel Watters is a 49 y.o. male being seen in office who was referred by Dr. William Gerard on 11/26/24 for   1. Dietary counseling and surveillance        2. Class 1 obesity with body mass index (BMI) of 30.0 to 30.9 in adult, unspecified obesity type, unspecified whether serious comorbidity present        3. Hypothyroidism, unspecified type              Nutrition Assessment    Food & Nutrition Related History:   5/2/25:   Maikel presents in office. Brings food logs and has questions. His food logs show he is eating more protein than prior visits. Has concerns about his sodium intake.     2/21/25:   Pt is now limiting fruit juice and fish milk. Trying to focus on more protein. Brings food log and list of questions today.     1/3/25:   Pt would like to discuss wt loss. He lost wt when he started taking levothyroxine but wt loss has plateaued. He has been using a food diary to track his intake the last couple weeks which he brings today. Pt keeps his food in a separate fridge in the garage. He says this helps him not to overeat on the items that his family members purchase as there is a clear boundary on what is considered his food. He works at [employer].     Allergies: None  Intolerance: only eats egg cooked in something   Appetite: Fluctuates - often skips meals   Intake: 50-75%  GI Symptoms : None  Swallowing Difficulty: No problems with swallowing  Dentition : own  Food Preparation: Patient and Children  Cooking Skills/Barriers: None reported  Grocery Shopping: Patient and Children  - grocery shopping every Sunday night   Supplements: Multivitamin, Vitamin D, and Calcium daily; occ takes papaya extract for upset stomach   Food Insecurity: Denies     Dietary Recall:   Meal 1: mandarin oranges // Yoplait flavored yogurt // 3 pieces elise   Meal 2: Quarter pounder from [employer]  - does not get much time for lunch, not reliable access to microwave   Meal 3: Beef and Rice Bowl with peppers , fresh  date  Snack: fudgesicle, peanuts, sparkling water, raspberry strudel     Snacks: hummus, peanuts   Beverages: water   Eating out: 2x per week, normally lunch (Korean or Chipotle salad)   Alcohol Intake: none   Self-Identified Challenges: time to prepare food     Physical Activity:   Volleyball at work 2-3x per week during summer  Softball team 2x per week during summer   During the winter, goes to [facility] fairly frequently       Blood Pressure:   BP Readings from Last 10 Encounters:   03/27/25 146/89   11/26/24 165/90   08/27/24 154/90   07/22/24 134/88   03/08/24 152/90   11/10/23 (!) 137/93   08/10/23 (!) 143/95   06/22/23 121/85   05/10/23 (!) 153/92   12/20/22 130/72       Labs:  CMP trend:    Recent Labs     11/05/24  0845 08/27/24  1047 03/02/24  0834 11/08/23  0930 05/11/23  1613 10/31/22  0000   GLUCOSE 93 87 93 95   < > 93    138 141 136   < > 139   K 4.0 4.1 4.4 4.2   < > 4.3    106 106 105   < > 107   CO2 25 24 26 25   < > 25   ANIONGAP 12 12 13 10   < > 11   BUN 16 16 15 14   < > 14   CREATININE 0.84 0.86 0.92 0.79   < > 0.83   EGFR >90 >90 >90 >90   < >  --    CALCIUM 9.8 9.5 10.0 9.9   < > 9.4   ALBUMIN 4.4 4.2 4.5 4.3   < > 4.1   ALKPHOS 65  --   --  58  --  60   PROT 7.9  --   --  8.0  --  7.8   AST 19  --   --  20  --  21   BILITOT 0.5  --   --  0.6  --  0.6   ALT 16  --   --  11  --  10    < > = values in this interval not displayed.     Lipid Panel trend:    Recent Labs     11/05/24  0845 11/08/23  0930 10/31/22  0000 09/21/20  0852 11/20/19  0000   CHOL 180 168 171 169 162   HDL 58.3 49.2 55.5 44.3 47.4   LDLCALC 106* 106*  --   --   --    LDLF  --   --  104* 105* 96   VLDL 16 13 11 20 18   TRIG 78 64 56 101 91     Vit D:   Lab Results   Component Value Date    VITD25 63 05/11/2023     Vit B12:   Lab Results   Component Value Date    IIAIZJYO16 507 06/15/2018     DM specific labs:   Recent Labs     05/03/21  0854 06/15/18  0913   HGBA1C 5.4 5.0       Medications:  Current  "Outpatient Medications   Medication Instructions    albuterol 90 mcg/actuation inhaler 2 puffs, Every 4 hours PRN    ascorbic acid, vitamin C, 500 mg capsule Take by mouth.    calcium carbonate-vitamin D3 500 mg-3.125 mcg (125 unit) tablet tablet 1 tablet, 2 times daily    cholecalciferol (VITAMIN D-3) 5,000 Units, Daily    cholecalciferol (Vitamin D3) 5,000 Units tablet Take by mouth.    dexAMETHasone (Decadron) 1 mg tablet Take 1 tablet when directed    levothyroxine (Synthroid, Levoxyl) 50 mcg tablet Take 1 tablet daily in the am on empty stomach as directed. Extra half on Wednesday and Sunday    multivitamin with minerals (Adults Multivitamin) tablet 1 tablet, Daily    polyethylene glycol-electrolytes (polyethylene glycol) 420 gram solution Take by mouth.        Nutrition Focused Physical Exam:  Performed/Deferred: Deferred as pt visually appears well-nourished with no signs of malnutrition      Past Medical History:  Patient Active Problem List   Diagnosis    Abnormal blood chemistry level    Abnormal thyroid screen (blood)    Allergic reaction    Deficiency of testosterone biosynthesis    Obesity    Closed nondisplaced fracture of middle phalanx of left little finger    Elevated prolactin level    Daytime somnolence    Fracture of phalanx of finger    Hypoglycemia    Hypogonadism in male    Hypothyroidism    Idiopathic gynecomastia    Obstructive sleep apnea syndrome    Pseudoangiomatous stromal hyperplasia of breast    Reactive airway disease (HHS-HCC)    Sleep disorder breathing    Stiffness of finger joint    Tachycardia    Vitamin D deficiency    Blood in stool    Thrombocytopenia (CMS-HCC)    Sore throat    Hematochezia    Foreign body sensation in throat    Foreign body in throat, initial encounter    Breast pain    Acute bronchitis    Other specified health status    Testicular hypofunction    Pain in left finger(s)        Anthropometrics:  Ht Readings from Last 1 Encounters:   03/27/25 1.803 m (5' 11\") "     BMI Readings from Last 1 Encounters:   03/27/25 31.52 kg/m²     Wt Readings from Last 10 Encounters:   03/27/25 103 kg (226 lb)   02/21/25 103 kg (228 lb)   11/26/24 107 kg (235 lb)   08/27/24 104 kg (228 lb 12.8 oz)   07/22/24 105 kg (231 lb 6.4 oz)   03/26/24 99.8 kg (220 lb)   03/08/24 99.8 kg (220 lb)   11/10/23 98.8 kg (217 lb 14.4 oz)   08/18/23 98.9 kg (218 lb)   08/10/23 99.8 kg (220 lb 0.3 oz)     Weight Change: Since last visit, wt loss of 2 lbs x 1 month    Estimated Nutrition Needs:    Total Energy Estimated Needs in 24 hours (kCal): 1800 kCal   Method for Estimating Needs: MSJ 1948 x 1.2 - 500 (for weight loss)   Total Protein Estimated Needs in 24 Hours (g): 85 g   Protein Estimated Needs per kg Body Weight in 24 Hours (g/kg): 0.8 g/kg    Nutrition Diagnosis     Patient has Nutrition Diagnosis: Yes Diagnosis Status (1): Active  Nutrition Diagnosis 1: Food and nutrition related knowledge deficit Related to (1): lack of adequate prior exposure to information As Evidenced by (1): patient has questions about changing diet.       Nutrition Interventions/Recommendations   FOOD & NUTRIENT DELIVERY: General Healthy Diet     NUTRITION COUNSELING: Goal Setting, Motivational Interviewing     COORDINATION OF CARE: None     NUTRITION EDUCATION:   Answered questions related to fiber. Encouraged 30-38 grams fiber daily. Discussed that he can reach this goal by incorporating more non-starchy vegetables in his diet. Discussed incorporating more fruit and when eating grains to choose whole grains.   Discussed sodium goal of 3135-7887 mg daily.   Answered other nutrition related questions to patient's satisfaction.     *Patient expressed understanding of the education provided and denied any additional questions/concerns.       Nutrition Monitoring and Evaluation   Food and Nutrient Intake  Monitoring and Evaluation Plan: Meal/snack pattern, Energy intake, Protein intake  Energy Intake: Estimated energy  intake  Criteria: 9949-9069 kcals/day  Meal/Snack Pattern: Estimated meal and snack pattern  Criteria: Work towards 3 balanced meals per day  Estimated protein intake: Estimated protein intake  Criteria: 80-90 grams of protein daily         Anthropometric measurements  Monitoring and Evaluation Plan: Weight  Criteria: Intentional weight loss of 0.5-2 lb per week, trending toward a clinically significant weight loss of 5-10% of current body weight.                      Readiness to Change : Excellent  Level of Understanding : Excellent  Anticipated Compliant : Excellent     Follow-up: 3 months

## 2025-06-20 ENCOUNTER — TELEPHONE (OUTPATIENT)
Facility: CLINIC | Age: 50
End: 2025-06-20
Payer: COMMERCIAL

## 2025-06-20 NOTE — TELEPHONE ENCOUNTER
Called patient to r/s 7/24 appt with Dr. Hale left patient a message to call the office back to get r/s

## 2025-07-03 ENCOUNTER — APPOINTMENT (OUTPATIENT)
Facility: CLINIC | Age: 50
End: 2025-07-03
Payer: COMMERCIAL

## 2025-07-03 VITALS
BODY MASS INDEX: 30.66 KG/M2 | DIASTOLIC BLOOD PRESSURE: 91 MMHG | WEIGHT: 219 LBS | OXYGEN SATURATION: 98 % | HEIGHT: 71 IN | SYSTOLIC BLOOD PRESSURE: 138 MMHG | RESPIRATION RATE: 18 BRPM | HEART RATE: 80 BPM

## 2025-07-03 DIAGNOSIS — G47.33 OSA (OBSTRUCTIVE SLEEP APNEA): Primary | ICD-10-CM

## 2025-07-03 DIAGNOSIS — E66.9 OBESITY (BMI 30-39.9): ICD-10-CM

## 2025-07-03 PROCEDURE — 3008F BODY MASS INDEX DOCD: CPT | Performed by: GENERAL PRACTICE

## 2025-07-03 PROCEDURE — 1036F TOBACCO NON-USER: CPT | Performed by: GENERAL PRACTICE

## 2025-07-03 PROCEDURE — 99214 OFFICE O/P EST MOD 30 MIN: CPT | Performed by: GENERAL PRACTICE

## 2025-07-03 ASSESSMENT — ENCOUNTER SYMPTOMS
DEPRESSION: 0
OCCASIONAL FEELINGS OF UNSTEADINESS: 0
LOSS OF SENSATION IN FEET: 0

## 2025-07-03 ASSESSMENT — PATIENT HEALTH QUESTIONNAIRE - PHQ9
1. LITTLE INTEREST OR PLEASURE IN DOING THINGS: NOT AT ALL
SUM OF ALL RESPONSES TO PHQ9 QUESTIONS 1 AND 2: 0
2. FEELING DOWN, DEPRESSED OR HOPELESS: NOT AT ALL

## 2025-07-03 ASSESSMENT — SLEEP AND FATIGUE QUESTIONNAIRES
SITING INACTIVE IN A PUBLIC PLACE LIKE A CLASS ROOM OR A MOVIE THEATER: WOULD NEVER DOZE
ESS-CHAD TOTAL SCORE: 1
HOW LIKELY ARE YOU TO NOD OFF OR FALL ASLEEP WHILE WATCHING TV: SLIGHT CHANCE OF DOZING
HOW LIKELY ARE YOU TO NOD OFF OR FALL ASLEEP IN A CAR, WHILE STOPPED FOR A FEW MINUTES IN TRAFFIC: WOULD NEVER DOZE
HOW LIKELY ARE YOU TO NOD OFF OR FALL ASLEEP WHILE SITTING AND READING: WOULD NEVER DOZE
HOW LIKELY ARE YOU TO NOD OFF OR FALL ASLEEP WHILE SITTING QUIETLY AFTER LUNCH WITHOUT ALCOHOL: WOULD NEVER DOZE
HOW LIKELY ARE YOU TO NOD OFF OR FALL ASLEEP WHILE SITTING AND TALKING TO SOMEONE: WOULD NEVER DOZE
HOW LIKELY ARE YOU TO NOD OFF OR FALL ASLEEP WHEN YOU ARE A PASSENGER IN A CAR FOR AN HOUR WITHOUT A BREAK: WOULD NEVER DOZE
HOW LIKELY ARE YOU TO NOD OFF OR FALL ASLEEP WHILE LYING DOWN TO REST IN THE AFTERNOON WHEN CIRCUMSTANCES PERMIT: WOULD NEVER DOZE

## 2025-07-03 NOTE — PROGRESS NOTES
Patient: Maikel Watters    [id number]  : 1975 -- AGE 49 y.o.    Provider: Robbie Hale DO     Location [facility]   Service Date: 7/3/2025              [facility]  Follow up Visit Note        HISTORY OF PRESENT ILLNESS     HISTORY OF PRESENT ILLNESS   Maikel Watters is a 49 y.o. male who presents to a [facility] for a sleep medicine evaluation with concerns of Sleep Apnea.     The patient  has a past medical history of Other chest pain (2018), Personal history of other diseases of the respiratory system (2018), and Personal history of other diseases of the respiratory system (2021)..    PAST SLEEP HISTORY    pmhx including obesity, hypothyroidism, reactive airway disease, elevated bp.     He presented originally as he has daytime sleepiness and fatigue. He was recently diagnosed with hypothyroidism and recently started on meds.   His pcp was concerned about sleep related problems contributing to his symptoms.   He has always snored, roommate in college has told him he snores. the fatigue and sleepiness started since  and worsened with covid and his continuos changing schedule.   walks on the weekends.      22: Patient is finding pap therapy very beneficial and would like to continue using it. Patient finds that his machine is helping him sleep much better, his environment allergies have improved, he feels more refreshed when he wakes up. He takes it with him when he travels.   download reviewed, scanned into chart.       23  Patient is using pap therapy regularly, he finds mask and pressure to be comfortable.     CURRENT HISTORY    24  the patient reports that he is uring pap therapy regularly with no issues.  The machine is what the pressure was worsening his    PAP Related Problems: no leak perceived,~no air hunger/need more pressure,~no dry mouth,~no skin irritation from mask~and~no  snoring with PAP.     Perceived Benefits of PAP Therapy: refreshing sleep,~decreased nocturia,~decreased daytime sleepiness,~decreased nocturnal awakenings~and~decreased snoring/ choking/ gasping.   Comments: improved nasal congestion.     7/3/25     Patient is trying to use pap therapy regularly. He is comfortable with his mask and settings.     PAP Related Problems: no leak perceived,~no air hunger/need more pressure,~no dry mouth,~no skin irritation from mask~and~no snoring with PAP.     Perceived Benefits of PAP Therapy: refreshing sleep,~decreased nocturia,~decreased daytime sleepiness,~decreased nocturnal awakenings~and~decreased snoring/ choking/ gasping.   improved nasal congestion.       Sleep schedule  on weekdays / work days:  Usual Bedtime: 11pm  Sleep latency: few min  Wake time : 7 am.   Total sleep time average/day: 8 hours/day  Awakenings: rarely, nocturia, short.   Naps: n    Sleep schedule  on weekends/non work days :  Same as above schedule.     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     Preferred sleeping position: SLEEP POSITION: sidelying    Sleep-related ROS:    Snoring:  n  Witnessed apneas:    n    Gasping/ choking: n     Am Dry mouth:  n           Nasal congestion: sometimes        am headaches: n    Sleep is described as refreshing.     Daytime sleepiness: n  Fatigue or decreased energy: n    Drowsy driving: n  Hx of car accident: n  Near-miss Car accident: n      RLS screen:  RLSSCREEN: - Sensations: Patient does not have unusual sensations in their extremities that cause an urge to move them     Sleep-related behaviors: DENIES    ESS: 1       REVIEW OF SYSTEMS     REVIEW OF SYSTEMS  Review of Systems   All other systems reviewed and are negative.        ALLERGIES AND MEDICATIONS     ALLERGIES  Allergies   Allergen Reactions    Levothyroxine Swelling    Menthol Unknown     Menthol-Sorbitol Unknown     Menthol Cough Lozenges     Other Swelling     Menthol Topical - Throat       MEDICATIONS  Current Outpatient Medications   Medication Sig Dispense Refill    albuterol 90 mcg/actuation inhaler Inhale 2 puffs every 4 hours if needed for wheezing or other (cough).      ascorbic acid, vitamin C, 500 mg capsule Take by mouth.      calcium carbonate-vitamin D3 500 mg-3.125 mcg (125 unit) tablet tablet Take 1 tablet by mouth 2 times a day.      cholecalciferol (Vitamin D-3) 25 MCG (1000 UT) capsule Take 5 capsules (5,000 Units) by mouth once daily.      cholecalciferol (Vitamin D3) 5,000 Units tablet Take by mouth.      levothyroxine (Synthroid, Levoxyl) 50 mcg tablet Take 1 tablet daily in the am on empty stomach as directed. Extra half on Wednesday and Sunday 102 tablet 3    multivitamin with minerals (Adults Multivitamin) tablet Take 1 tablet by mouth once daily.      polyethylene glycol-electrolytes (polyethylene glycol) 420 gram solution Take by mouth.      dexAMETHasone (Decadron) 1 mg tablet Take 1 tablet when directed 1 tablet 0     No current facility-administered medications for this visit.         PAST HISTORY     PAST MEDICAL HISTORY  He  has a past medical history of Other chest pain (04/13/2018), Personal history of other diseases of the respiratory system (04/03/2018), and Personal history of other diseases of the respiratory system (01/27/2021).    PAST SURGICAL HISTORY:  Past Surgical History:   Procedure Laterality Date    HERNIA REPAIR  06/07/2013    Hernia Repair    OTHER SURGICAL HISTORY  12/20/2022    Colonoscopy    OTHER SURGICAL HISTORY  11/10/2022    Mastectomy bilateral       FAMILY HISTORY  Family History   Problem Relation Name Age of Onset    Congenital heart disease Mother      Congenital heart disease Brother      Sleep apnea Brother          on CPAP    Colon polyps Brother          sigmoid     DOES/DOES NOT EC: does have a family history of sleep  "disorder.      SOCIAL HISTORY  He  reports that he has never smoked. He has never used smokeless tobacco. He reports that he does not currently use alcohol. He reports that he does not use drugs.     Caffeine consumption: no      PHYSICAL EXAM     VITAL SIGNS: BP (!) 138/91   Pulse 80   Resp 18   Ht 1.803 m (5' 11\")   Wt 99.3 kg (219 lb)   SpO2 98%   BMI 30.54 kg/m²      PREVIOUS WEIGHTS:  Wt Readings from Last 3 Encounters:   07/03/25 99.3 kg (219 lb)   03/27/25 103 kg (226 lb)   02/21/25 103 kg (228 lb)       Physical Exam  Constitutional: Alert and oriented, cooperative, no obvious distress.   HENT: normocephalic.   Neurologic: AOx3.   psychiatric: appropriate mood and affect.  Integumentary: no significant rashes observed over pap mask area.       RESULTS/DATA     Iron (ug/dL)   Date Value   10/20/2022 88     Transferrin (mg/dL)   Date Value   10/20/2022 234     Iron Saturation (%)   Date Value   10/20/2022 27     TIBC (ug/dL)   Date Value   10/20/2022 322               ASSESSMENT/PLAN     Mr. Watters is a 49 y.o. male and  has a past medical history of Other chest pain (04/13/2018), Personal history of other diseases of the respiratory system (04/03/2018), and Personal history of other diseases of the respiratory system (01/27/2021). He is following up on sleep apnea.     Problem List Items Addressed This Visit    None      Problem List and Orders     male with pmhx including obesity, hypothyroidism, reactive airway disease, elevated bp.     1-DONYA  original symptoms include has sleepiness and fatigue during the day, wakes up a few times per week due to nocturia, and snores. brother has sleep apnea and on pap therapy.      -psg 3/12/21-->mild DONYA, AHI 13.7; spO2 andre 87.3% worse in supine and REM. PACs but patient denies any symptoms including no cp/ sob/ palpitations. Arm leads monitored but no excessive muscle twitch activity.      -cont. Autopap 5-15cwp. rAHI 0.7 median pressure 5.6, max avg 9.8, good " seal overall; good compliance.   patient is seeing an improvement in symptoms and is not having any issues.     -ordered new machine Autopap 5-15cwp. Patient will let us know if there are any issues.      -do not drive or operate heavy machinery if drowsy.  -avoid sleeping on your back.   -avoid sedating substances/ medication, alcohol, illicit drugs and tobacco.     2-Obese  counseled on eating a healthy diet and exercising as tolerated.  joined the [employer] team at work, does walking and swimming.      3- Acting out of dreams? resolved   was 1x per month, when he dreams about running, he runs. no injuries. not falling out of bed. usually he finds himself caught in the bed cover.   in lab sleep study did not show increased muscle twitch in REM. episodes might be due to respiratory events. no episodes while on pap therapy.    counseled on safety measures.         f/u 3 months or sooner as needed

## 2025-07-24 ENCOUNTER — APPOINTMENT (OUTPATIENT)
Dept: SLEEP MEDICINE | Facility: CLINIC | Age: 50
End: 2025-07-24
Payer: COMMERCIAL

## 2025-08-01 ENCOUNTER — NUTRITION (OUTPATIENT)
Dept: NUTRITION | Facility: CLINIC | Age: 50
End: 2025-08-01
Payer: COMMERCIAL

## 2025-08-01 VITALS — WEIGHT: 217 LBS | BODY MASS INDEX: 30.27 KG/M2

## 2025-08-01 DIAGNOSIS — E66.811 CLASS 1 OBESITY WITH BODY MASS INDEX (BMI) OF 30.0 TO 30.9 IN ADULT, UNSPECIFIED OBESITY TYPE, UNSPECIFIED WHETHER SERIOUS COMORBIDITY PRESENT: ICD-10-CM

## 2025-08-01 DIAGNOSIS — E03.9 HYPOTHYROIDISM, UNSPECIFIED TYPE: ICD-10-CM

## 2025-08-01 DIAGNOSIS — Z71.3 DIETARY COUNSELING AND SURVEILLANCE: Primary | ICD-10-CM

## 2025-08-01 PROCEDURE — 97803 MED NUTRITION INDIV SUBSEQ: CPT

## 2025-08-01 NOTE — PROGRESS NOTES
Nutrition: Follow-up    Maikel Watters is a 49 y.o. male being seen in office who was referred by Dr. William Gerard on 11/26/24 for   1. Dietary counseling and surveillance        2. Class 1 obesity with body mass index (BMI) of 30.0 to 30.9 in adult, unspecified obesity type, unspecified whether serious comorbidity present        3. Hypothyroidism, unspecified type            Nutrition Assessment    Food & Nutrition Related History:   8/1/25:   Maikel presents in office. He reports that he has been very active this summer. Has several nutrition related questions today.     5/2/25:   Maikel presents in office. Brings food logs and has questions. His food logs show he is eating more protein than prior visits. Has concerns about his sodium intake.     2/21/25:   Pt is now limiting fruit juice and fish milk. Trying to focus on more protein. Brings food log and list of questions today.     1/3/25:   Pt would like to discuss wt loss. He lost wt when he started taking levothyroxine but wt loss has plateaued. He has been using a food diary to track his intake the last couple weeks which he brings today. Pt keeps his food in a separate fridge in the garage. He says this helps him not to overeat on the items that his family members purchase as there is a clear boundary on what is considered his food. He works at [employer].     Allergies: None  Intolerance: only eats egg cooked in something   Appetite: Fluctuates - often skips meals   Intake: 50-75%  GI Symptoms : None  Swallowing Difficulty: No problems with swallowing  Dentition : own  Food Preparation: Patient and Children  Cooking Skills/Barriers: None reported  Grocery Shopping: Patient and Children  - grocery shopping every Sunday night   Supplements: Multivitamin, Vitamin D, and Calcium daily; occ takes papaya extract for upset stomach   Food Insecurity: Denies     Dietary Recall:   Meal 1 @ 7 AM: high protein yogurt or banana or orange   Meal 2 @ 1-1:30 PM: hummus + fig  "newtons + edamame + sunflower seeds   May eat a snack around 3 PM   Meal 3: varies     Snacks: hummus, peanuts   Beverages: water   Eating out: 2x per week, normally lunch (Korean or Chipotle salad)   Alcohol Intake: none   Self-Identified Challenges: time to prepare food     Physical Activity:   Volleyball at work 2-3x per week during summer  Softball team 2x per week during summer   During the winter, goes to [facility] fairly frequently     Blood Pressure:   BP Readings from Last 10 Encounters:   07/03/25 (!) 138/91   03/27/25 146/89   11/26/24 165/90   08/27/24 154/90   07/22/24 134/88   03/08/24 152/90   11/10/23 (!) 137/93   08/10/23 (!) 143/95   06/22/23 121/85   05/10/23 (!) 153/92     Anthropometrics:  Ht Readings from Last 1 Encounters:   07/03/25 1.803 m (5' 11\")     BMI Readings from Last 1 Encounters:   08/01/25 30.27 kg/m²     Wt Readings from Last 10 Encounters:   08/01/25 98.4 kg (217 lb)   07/03/25 99.3 kg (219 lb)   03/27/25 103 kg (226 lb)   02/21/25 103 kg (228 lb)   11/26/24 107 kg (235 lb)   08/27/24 104 kg (228 lb 12.8 oz)   07/22/24 105 kg (231 lb 6.4 oz)   03/26/24 99.8 kg (220 lb)   03/08/24 99.8 kg (220 lb)   11/10/23 98.8 kg (217 lb 14.4 oz)     Weight Change %:  Weight History / % Weight Change: Wt loss of 9 lbs (4%) x 4 months from 3/27/25 to 8/1/25  Significant Weight Loss: No    Nutrition Focused Physical Exam Findings:  Subcutaneous Fat Loss:   Defer Subcutaneous Fat Loss Assessment: Defer all  Defer All Reason: Visually appears well nourished with no signs of noticeable wasting    Muscle Wasting:  Defer Muscle Wasting Assessment: Defer all  Defer All Reason: Visually appears well nourished with no signs of noticeable wasting      Nutrition Significant Labs:  CMP Trend:    Recent Labs     11/05/24  0845 08/27/24  1047 03/02/24  0834 11/08/23  0930 05/11/23  1613 10/31/22  0000   GLUCOSE 93 87 93 95   < > 93    138 141 136   < > 139   K 4.0 4.1 4.4 4.2   < > 4.3    " 106 106 105   < > 107   CO2 25 24 26 25   < > 25   ANIONGAP 12 12 13 10   < > 11   BUN 16 16 15 14   < > 14   CREATININE 0.84 0.86 0.92 0.79   < > 0.83   EGFR >90 >90 >90 >90   < >  --    CALCIUM 9.8 9.5 10.0 9.9   < > 9.4   ALBUMIN 4.4 4.2 4.5 4.3   < > 4.1   ALKPHOS 65  --   --  58  --  60   PROT 7.9  --   --  8.0  --  7.8   AST 19  --   --  20  --  21   BILITOT 0.5  --   --  0.6  --  0.6   ALT 16  --   --  11  --  10    < > = values in this interval not displayed.     DM Specific Labs Trend:  Recent Labs     05/03/21  0854 06/15/18  0913   HGBA1C 5.4 5.0     Lipid Panel Trend:    Recent Labs     11/05/24  0845 11/08/23  0930 10/31/22  0000 09/21/20  0852 11/20/19  0000   CHOL 180 168 171 169 162   HDL 58.3 49.2 55.5 44.3 47.4   LDLCALC 106* 106*  --   --   --    LDLF  --   --  104* 105* 96   VLDL 16 13 11 20 18   TRIG 78 64 56 101 91     Vitamin D:   Lab Results   Component Value Date    VITD25 63 05/11/2023        Medications:  Current Outpatient Medications   Medication Instructions    albuterol 90 mcg/actuation inhaler 2 puffs, Every 4 hours PRN    ascorbic acid, vitamin C, 500 mg capsule Take by mouth.    calcium carbonate-vitamin D3 500 mg-3.125 mcg (125 unit) tablet tablet 1 tablet, 2 times daily    cholecalciferol (VITAMIN D-3) 5,000 Units, Daily    cholecalciferol (Vitamin D3) 5,000 Units tablet Take by mouth.    dexAMETHasone (Decadron) 1 mg tablet Take 1 tablet when directed    levothyroxine (Synthroid, Levoxyl) 50 mcg tablet Take 1 tablet daily in the am on empty stomach as directed. Extra half on Wednesday and Sunday    multivitamin with minerals (Adults Multivitamin) tablet 1 tablet, Daily    polyethylene glycol-electrolytes (polyethylene glycol) 420 gram solution Take by mouth.        Estimated Needs:  Total Energy Estimated Needs in 24 hours (kCal): 1800 kCal; Method for Estimating Needs: MS 1948 x 1.2 - 500 (for weight loss)  Total Protein Estimated Needs in 24 Hours (g): 85 g; Method for  Estimating 24 Hour Protein Needs: 0.8 g/kg   ;     ;                    Nutrition Diagnosis        Nutrition Diagnosis  Patient has Nutrition Diagnosis: Yes  Diagnosis Status (1): Active  Nutrition Diagnosis 1: Food and nutrition related knowledge deficit  Related to (1): lack of adequate prior exposure to information  As Evidenced by (1): patient has questions about changing diet.       Nutrition Interventions/Recommendations   Nutrition Prescription: Oral nutrition general healthy diet    Nutrition Interventions:   Food and Nutrient Delivery: Meals & Snacks: General Healthful Diet     Coordination of Care: Goals: N/A     Nutrition Education:   Content related nutrition education  Discussed that it would be fine for him to make lunch his bigger meal of the day and have a lighter dinner since he is often getting home later in the evening.   Encouraged the addition of strength/resistance training. Discussed HASFIT.   Reiterated importance of adequate protein (80-90 grams daily).     Educational Handouts Provided: N/A, patient has materials from prior visits     Nutrition Counseling:   Nutrition Counseling Strategies : Nutrition counseling based on motivational interviewing strategy, Nutrition counseling based on goal setting strategy    Patient Goals:    Aim for a minimum of 2 days of strength/resistance activity (look at HASFIT)   Ensure that you are eating adequate protein (80-90 grams daily)    Readiness to Change : Good  Level of Understanding : Good  Anticipated Compliant : Good         Nutrition Monitoring and Evaluation   Food and Nutrient Intake  Monitoring and Evaluation Plan: Meal/snack pattern, Energy intake, Protein intake  Energy Intake: Estimated energy intake  Criteria: [id number] kcals/day  Meal/Snack Pattern: Estimated meal and snack pattern  Criteria: Work towards 3 balanced meals per day  Estimated protein intake: Estimated protein intake  Criteria: 80-90 grams of protein daily          Anthropometric measurements  Monitoring and Evaluation Plan: Weight  Criteria: Intentional weight loss of 0.5-2 lb per week, trending toward a clinically significant weight loss of 5-10% of current body weight.              Goal Status: Goal Status: Some progress toward goal         Follow Up: 6 months

## 2025-08-01 NOTE — PATIENT INSTRUCTIONS
PLAN:   Aim for a minimum of 2 days of strength/resistance activity (look at HASFIT)   Ensure that you are eating adequate protein (80-90 grams daily)

## 2025-09-04 ENCOUNTER — APPOINTMENT (OUTPATIENT)
Dept: ENDOCRINOLOGY | Facility: CLINIC | Age: 50
End: 2025-09-04
Payer: COMMERCIAL

## 2025-10-14 ENCOUNTER — APPOINTMENT (OUTPATIENT)
Dept: ENDOCRINOLOGY | Facility: CLINIC | Age: 50
End: 2025-10-14
Payer: COMMERCIAL

## 2025-10-16 ENCOUNTER — APPOINTMENT (OUTPATIENT)
Facility: CLINIC | Age: 50
End: 2025-10-16
Payer: COMMERCIAL

## 2026-02-03 ENCOUNTER — APPOINTMENT (OUTPATIENT)
Dept: NUTRITION | Facility: CLINIC | Age: 51
End: 2026-02-03
Payer: COMMERCIAL